# Patient Record
Sex: FEMALE | Race: WHITE | NOT HISPANIC OR LATINO | Employment: UNEMPLOYED | URBAN - METROPOLITAN AREA
[De-identification: names, ages, dates, MRNs, and addresses within clinical notes are randomized per-mention and may not be internally consistent; named-entity substitution may affect disease eponyms.]

---

## 2017-11-21 ENCOUNTER — ALLSCRIPTS OFFICE VISIT (OUTPATIENT)
Dept: OTHER | Facility: OTHER | Age: 37
End: 2017-11-21

## 2017-11-22 NOTE — PROGRESS NOTES
Plan  Contraception    · Junel FE 1/20 1-20 MG-MCG Oral Tablet; TAKE 1 TABLET DAILY   Rx By: Kaleb Porter; Dispense: 84 Days ; #:84 Tablet; Refill: 0;Contraception; LAVERNE = N; Verified Transmission to [address] [place] [address] [id number]; Last Updated By: System, [employer]; 2017 2:47:30 PM    Discussion/Summary    The patient was informed of a stable gyn examination  A Pap smear was not performed because of prior history being HPV negative in   After discussion with the patient and her  with they have decided they would like to go back on the birth control pill for cycle control  And also for contraception  She has no absolute contraindications  She is not a smoker  She has been on the pill before without problems  Will now put her back on the [address] [place] [address]  INSTRUCTIONS WERE GIVEN  I have asked her return my office in 2 months for OCP surveillance  She was also given a brochure about the IUD she may choose this method later  We will now start using the birth control pill for cycle control and contraception  There are no barriers to this goal    Patient is able to Self-Care  Possible side effects of new medications were reviewed with the patient/guardian today  The treatment plan was reviewed with the patient/guardian  The patient/guardian understands and agrees with the treatment plan      Chief Complaint  yearly      History of Present Illness  HPI: This is a 52-year-old  female, she is a  2 para 2 with 2 prior vaginal deliveries  Her current method of contraception includes for nectar family planning and withdrawal  She is happy with this method and is aware of the possibility of pregnancy  She has noted that her menstrual cycles are lasting over the bit longer, this is by secondary to going off the birth control pill  She denies any other major gynecological  GI complaint  There is no problem with intimacy  She did express a desire for and exercise plan   The patient denies any problem with depression or anxiety  There are no new major family illnesses report this time  GYN HM, Adult Female [facility]: The patient is being seen for a gynecology evaluation  The last health maintenance visit was 1 year(s) ago  General Health: The patient's health since the last visit is described as good  She has regular dental visits  -- She complains of vision problems  -- She denies hearing loss  Lifestyle:  She consumes a diverse and healthy diet  -- She does not have any weight concerns  -- She does not exercise regularly  -- She does not use tobacco -- She denies alcohol use  -- She denies drug use  Reproductive health:  she reports menstrual problems  Menstrual history: Menstrual Problems: prolonged menses 7 -10 days  Screening:      Review of Systems  Additional findings include prolonged menses  Constitutional: No fever, no chills, feels well, no tiredness, no recent weight gain or loss  ENT: no ear ache, no loss of hearing, no nosebleeds or nasal discharge, no sore throat or hoarseness  Cardiovascular: no complaints of slow or fast heart rate, no chest pain, no palpitations, no leg claudication or lower extremity edema  Respiratory: no complaints of shortness of breath, no wheezing, no dyspnea on exertion, no orthopnea or PND  Breasts: no complaints of breast pain, breast lump or nipple discharge  Gastrointestinal: no complaints of abdominal pain, no constipation, no nausea or diarrhea, no vomiting, no bloody stools  Genitourinary: no complaints of dysuria, no incontinence, no pelvic pain, no dysmenorrhea, no vaginal discharge or abnormal vaginal bleeding  Musculoskeletal: no complaints of arthralgia, no myalgia, no joint swelling or stiffness, no limb pain or swelling  Integumentary: no complaints of skin rash or lesion, no itching or dry skin, no skin wounds  Neurological: no complaints of headache, no confusion, no numbness or tingling, no dizziness or fainting       Over the past 2 weeks, how often have you been bothered by the following problems? 1 ) Little interest or pleasure in doing things? Not at all   2 ) Feeling down, depressed or hopeless? Not at all   3 ) Trouble falling asleep or sleeping too much? Not at all   4 ) Feeling tired or having little energy? Not at all   5 ) Poor appetite or overeating? Not at all   6 ) Feeling bad about yourself, or that you are a failure, or have let yourself or your family down? Not at all   7 ) Trouble concentrating on things, such as reading a newspaper or watching television? Not at all   8 ) Moving or speaking so slowly that other people could have noticed, or the opposite, moving or speaking faster than usual? Not at all  How difficult have these problems made it for you to do your work, take care of things at home, or get along with people? Not at all  Score       OB History  Pregnancy History (Brief):  Prior pregnancies: : 2  Para: 2 (full-term)-- and-- 2 (living)  Delivery type: vaginal       Active Problems  1  Encounter for gynecological examination () (Z01 419)    Family History  Mother    · Family history of diabetes mellitus (V18 0) (Z83 3)   · Family history of hypertension (V17 49) (Z82 49)  Father    · Family history of liver cancer (V16 0) (Z80 0)    Social History   · Former smoker (V15 82) (R59 910)    Current Meds   1  No Reported Medications Recorded    Allergies  1  No Known Drug Allergies    Vitals   Recorded: [id number] 07:59HM   Systolic 562   Diastolic 74   Height 5 ft 10 in   Weight 164 lb    BMI Calculated 23 53   BSA Calculated 1 92   LMP [id number]       Physical Exam   Constitutional  General appearance: No acute distress, well appearing and well nourished  Neck  Neck: Normal, supple, trachea midline, no masses  Thyroid: Normal, no thyromegaly  Pulmonary  Respiratory effort: No increased work of breathing or signs of respiratory distress  Auscultation of lungs: Clear to auscultation  Cardiovascular  Auscultation of heart: Normal rate and rhythm, normal S1 and S2, no murmurs  Peripheral vascular exam: Normal pulses Throughout  Genitourinary  External genitalia: Normal and no lesions appreciated  Vagina: Normal, no lesions or dryness appreciated  Urethra: Normal    Urethral meatus: Normal    Bladder: Normal, soft, non-tender and no prolapse or masses appreciated  Cervix: Normal, no palpable masses  Uterus: Normal, non-tender, not enlarged, and no palpable masses  Adnexa/parametria: Normal, non-tender and no fullness or masses appreciated  Anus, perineum, and rectum: Normal sphincter tone, no masses, and no prolapse  Chest  Breasts: Normal and no dimpling or skin changes noted  Abdomen  Abdomen: Normal, non-tender, and no organomegaly noted  Liver and spleen: No hepatomegaly or splenomegaly  Examination for hernias: No hernias appreciated  Lymphatic  Palpation of lymph nodes in neck, axillae, groin and/or other locations: No lymphadenopathy or masses noted  Skin  Skin and subcutaneous tissue: Normal skin turgor and no rashes     Palpation of skin and subcutaneous tissue: Normal    Psychiatric  Orientation to person, place, and time: Normal    Mood and affect: Normal        Signatures   Electronically signed by : PROSPER Blanco ; Nov 21 2017  2:49PM EST                       (Author)

## 2018-01-13 VITALS
WEIGHT: 164 LBS | DIASTOLIC BLOOD PRESSURE: 74 MMHG | SYSTOLIC BLOOD PRESSURE: 114 MMHG | BODY MASS INDEX: 23.48 KG/M2 | HEIGHT: 70 IN

## 2018-02-14 DIAGNOSIS — Z30.41 ENCOUNTER FOR SURVEILLANCE OF CONTRACEPTIVE PILLS: Primary | ICD-10-CM

## 2018-02-14 RX ORDER — NORETHINDRONE ACETATE/ETHINYL ESTRADIOL AND FERROUS FUMARATE 1MG-20(21)
KIT ORAL
Qty: 84 TABLET | Refills: 0 | Status: SHIPPED | OUTPATIENT
Start: 2018-02-14

## 2018-02-28 ENCOUNTER — OFFICE VISIT (OUTPATIENT)
Dept: OBGYN CLINIC | Facility: CLINIC | Age: 38
End: 2018-02-28
Payer: COMMERCIAL

## 2018-02-28 VITALS
DIASTOLIC BLOOD PRESSURE: 82 MMHG | SYSTOLIC BLOOD PRESSURE: 138 MMHG | WEIGHT: 181 LBS | BODY MASS INDEX: 28.41 KG/M2 | HEIGHT: 67 IN

## 2018-02-28 DIAGNOSIS — Z30.41 ENCOUNTER FOR SURVEILLANCE OF CONTRACEPTIVE PILLS: Primary | ICD-10-CM

## 2018-02-28 PROCEDURE — 99213 OFFICE O/P EST LOW 20 MIN: CPT | Performed by: OBSTETRICS & GYNECOLOGY

## 2018-02-28 RX ORDER — NORETHINDRONE ACETATE AND ETHINYL ESTRADIOL 1; .02 MG/1; MG/1
1 TABLET ORAL DAILY
Qty: 42 TABLET | Refills: 3 | Status: SHIPPED | OUTPATIENT
Start: 2018-02-28 | End: 2018-04-16 | Stop reason: SDUPTHER

## 2018-02-28 NOTE — PROGRESS NOTES
This is a 60-year-old white female accompanied by her  for follow-up for birth control pills for contraception  The current pill she is on is a generic form  She does not like this pill  She says she feels more flush  Feel she gets a rash  She is now requesting to go back on the brand name Guadalupe Worley DECIDED TO TRY TO BRAN FOR THE NEXT 2 MONTHS  RETURN MY OFFICE IN 1 MONTH FOR RE-EVALUATION OF ELEVATED BLOOD PRESSURE AND SEE IF THE RASH IS BETTER  WE MAY NEED TO CONSIDER OTHER METHODS OF CONTRACEPTION  SHE WAS INSTRUCTED TO USE A BACKUP METHOD FOR THE NEXT MONTH AS CHANGED  THE WAY SHE IS TAKING THE PILL  HER  WAS TRANSLATING AND THEY BOTH SEEMED TO UNDERSTAND  RETURN TO OFFICE IN 1 MONTH FOR RE-EVALUATION CHECK BLOOD PRESSURE

## 2018-04-16 ENCOUNTER — OFFICE VISIT (OUTPATIENT)
Dept: OBGYN CLINIC | Facility: CLINIC | Age: 38
End: 2018-04-16
Payer: COMMERCIAL

## 2018-04-16 VITALS
WEIGHT: 179.8 LBS | DIASTOLIC BLOOD PRESSURE: 78 MMHG | HEIGHT: 67 IN | BODY MASS INDEX: 28.22 KG/M2 | SYSTOLIC BLOOD PRESSURE: 128 MMHG

## 2018-04-16 DIAGNOSIS — Z30.41 ENCOUNTER FOR SURVEILLANCE OF CONTRACEPTIVE PILLS: Primary | ICD-10-CM

## 2018-04-16 PROCEDURE — 99213 OFFICE O/P EST LOW 20 MIN: CPT | Performed by: OBSTETRICS & GYNECOLOGY

## 2018-04-16 RX ORDER — NORETHINDRONE ACETATE AND ETHINYL ESTRADIOL 1; .02 MG/1; MG/1
1 TABLET ORAL DAILY
Qty: 63 TABLET | Refills: 3 | Status: SHIPPED | OUTPATIENT
Start: 2018-04-16 | End: 2018-11-28 | Stop reason: SDUPTHER

## 2018-04-16 NOTE — PATIENT INSTRUCTIONS
The patient is doing well on this birth control pill    Will continue that she will make appointment for yearly exam

## 2018-04-16 NOTE — PROGRESS NOTES
This is a 78-year-old female accompanied by her  as a   She is a  2 para 2  She is now here for birth control pill follow-up  Weeks which should to the Glenis Martin this is working better she likes better  Her side effects are much improved  Will now use for contraception her refill was authorized  She should keep her yearly exam as scheduled

## 2018-11-28 ENCOUNTER — ANNUAL EXAM (OUTPATIENT)
Dept: OBGYN CLINIC | Facility: CLINIC | Age: 38
End: 2018-11-28
Payer: COMMERCIAL

## 2018-11-28 VITALS
BODY MASS INDEX: 28.94 KG/M2 | HEIGHT: 67 IN | DIASTOLIC BLOOD PRESSURE: 78 MMHG | WEIGHT: 184.4 LBS | SYSTOLIC BLOOD PRESSURE: 120 MMHG

## 2018-11-28 DIAGNOSIS — Z30.41 ENCOUNTER FOR SURVEILLANCE OF CONTRACEPTIVE PILLS: ICD-10-CM

## 2018-11-28 DIAGNOSIS — Z01.419 WOMEN'S ANNUAL ROUTINE GYNECOLOGICAL EXAMINATION: Primary | ICD-10-CM

## 2018-11-28 PROCEDURE — 99395 PREV VISIT EST AGE 18-39: CPT | Performed by: OBSTETRICS & GYNECOLOGY

## 2018-11-28 RX ORDER — NORETHINDRONE ACETATE AND ETHINYL ESTRADIOL 1; .02 MG/1; MG/1
1 TABLET ORAL DAILY
Qty: 84 TABLET | Refills: 3 | Status: SHIPPED | OUTPATIENT
Start: 2018-11-28

## 2018-11-28 NOTE — PATIENT INSTRUCTIONS
The patient was informed of a stable gyn examination  No Pap smear because of prior history being HPV negative in the year 2016  Will given information about the intrauterine device  Return to office in 1 year  A refill of birth control pills authorized

## 2019-05-30 DIAGNOSIS — Z30.41 ENCOUNTER FOR SURVEILLANCE OF CONTRACEPTIVE PILLS: ICD-10-CM

## 2019-06-01 RX ORDER — NORETHINDRONE ACETATE AND ETHINYL ESTRADIOL 1; 20 MG/1; UG/1
TABLET ORAL
Qty: 42 TABLET | Refills: 3 | Status: SHIPPED | OUTPATIENT
Start: 2019-06-01 | End: 2020-12-07 | Stop reason: SDUPTHER

## 2019-12-09 ENCOUNTER — ANNUAL EXAM (OUTPATIENT)
Dept: OBGYN CLINIC | Facility: CLINIC | Age: 39
End: 2019-12-09
Payer: COMMERCIAL

## 2019-12-09 VITALS
SYSTOLIC BLOOD PRESSURE: 116 MMHG | HEIGHT: 67 IN | DIASTOLIC BLOOD PRESSURE: 78 MMHG | WEIGHT: 179.6 LBS | BODY MASS INDEX: 28.19 KG/M2

## 2019-12-09 DIAGNOSIS — Z01.419 WOMEN'S ANNUAL ROUTINE GYNECOLOGICAL EXAMINATION: Primary | ICD-10-CM

## 2019-12-09 PROCEDURE — G0145 SCR C/V CYTO,THINLAYER,RESCR: HCPCS | Performed by: OBSTETRICS & GYNECOLOGY

## 2019-12-09 PROCEDURE — 87624 HPV HI-RISK TYP POOLED RSLT: CPT | Performed by: OBSTETRICS & GYNECOLOGY

## 2019-12-09 PROCEDURE — 99395 PREV VISIT EST AGE 18-39: CPT | Performed by: OBSTETRICS & GYNECOLOGY

## 2019-12-09 RX ORDER — DOXYCYCLINE HYCLATE 100 MG
100 TABLET ORAL 2 TIMES DAILY
Refills: 1 | COMMUNITY
Start: 2019-10-23

## 2019-12-09 NOTE — PATIENT INSTRUCTIONS
The patient was informed of a stable gyn examination except for dermatitis of the lower half of the external genitalia bilaterally  This is consistent with dermatitis  Will treat with a topical steroid cream   Twice a day for next 7 days  She will call me if there is no improvement  She was also given information about the IUD for contraception she will let us know that decision  Return my office on a p r n  Basis

## 2019-12-09 NOTE — PROGRESS NOTES
Assessment/Plan:    The patient was informed of a stable gyn examination except for bilateral redness of the external genitalia lower half of the external genitalia, consistent with for dermatitis  She has no obvious infection the vagina is clean  Patient was instructed to with cord 8 cream 2 times a day for 7 days on external genitalia keep me informed of her progress  A Pap smear was performed  She may consider the IUD for contraception  A brochure was given to she and her  and questions call back later  She will be allowed to continue the birth control pill at the present time  She should return my office in 1 year unless new problems arise  Subjective:      Patient ID: Tamara Prieto is a 46 y o  female  HPI       This is a 71-year-old white female, she is accompanied by her   She is a  2 para 2 with 2 prior vaginal deliveries  We still have a language barrier  Her  serves as a   The of both from [place]  Her current method of includes the birth control pill  She was inquiring about the possible using the out the IUD  A brochure was given  Her  will call us so the decision  She denies any major  GI complaint  She is complaining of frequent itching of the external genitalia  She does have a history of rosacea has been treated for that  She denies any problem abnormal vaginal discharge  She is happy with her weight  She has a dentist on a regular basis  She denies any problem depression or anxiety  Her children are doing well  There are no new major family illnesses report at this time  The following portions of the patient's history were reviewed and updated as appropriate: allergies, current medications, past family history, past medical history, past social history, past surgical history and problem list     Review of Systems   All other systems reviewed and are negative          Objective:      /78   Ht 5' 7" (1 702 m)   Wt 81 5 kg (179 lb 9 6 oz)   LMP 12/03/2019   BMI 28 13 kg/m²          Physical Exam   Constitutional: She appears well-developed and well-nourished  HENT:   Head: Normocephalic and atraumatic  Eyes: EOM are normal    Neck: Normal range of motion  Neck supple  No JVD present  No tracheal deviation present  No thyromegaly present  Cardiovascular: Normal rate, regular rhythm, normal heart sounds and intact distal pulses  Pulmonary/Chest: Effort normal and breath sounds normal  No stridor  No respiratory distress  She has no wheezes  She has no rales  Right breast exhibits no inverted nipple, no mass, no nipple discharge, no skin change and no tenderness  Left breast exhibits no inverted nipple, no mass, no nipple discharge, no skin change and no tenderness  No breast swelling, tenderness, discharge or bleeding  Breasts are symmetrical    Abdominal: Soft  Bowel sounds are normal  She exhibits no distension and no mass  There is no tenderness  There is no rebound and no guarding  No hernia  Hernia confirmed negative in the right inguinal area and confirmed negative in the left inguinal area  Genitourinary: Uterus normal  No labial fusion  There is rash on the right labia  There is no tenderness, lesion or injury on the right labia  There is no tenderness, lesion or injury on the left labia  Uterus is not deviated, not enlarged, not fixed and not tender  Cervix exhibits no motion tenderness, no discharge and no friability  Right adnexum displays no mass, no tenderness and no fullness  Left adnexum displays no mass, no tenderness and no fullness  No erythema, tenderness or bleeding in the vagina  No foreign body in the vagina  No signs of injury around the vagina  No vaginal discharge found  Genitourinary Comments: The external genitalia on the lower half is consistent with irritation and dermatitis  The vagina is otherwise clean  A Pap smear was performed   Musculoskeletal: Normal range of motion  Lymphadenopathy:     She has no cervical adenopathy  No inguinal adenopathy noted on the right or left side  Neurological: She is alert  Skin: Skin is warm and dry  Psychiatric: She has a normal mood and affect   Her behavior is normal

## 2019-12-11 LAB
HPV HR 12 DNA CVX QL NAA+PROBE: NEGATIVE
HPV16 DNA CVX QL NAA+PROBE: NEGATIVE
HPV18 DNA CVX QL NAA+PROBE: NEGATIVE

## 2019-12-12 LAB
LAB AP GYN PRIMARY INTERPRETATION: NORMAL
LAB AP LMP: NORMAL
Lab: NORMAL

## 2020-01-06 ENCOUNTER — TELEPHONE (OUTPATIENT)
Dept: OBGYN CLINIC | Facility: CLINIC | Age: 40
End: 2020-01-06

## 2020-01-06 NOTE — TELEPHONE ENCOUNTER
[facility] calling for rf for Junel 1/20 - pt here for yearly 12/9/19 - verbal to pharm for Junel FE 1/20 # 84, rf x 3

## 2020-11-09 ENCOUNTER — TELEPHONE (OUTPATIENT)
Dept: OBGYN CLINIC | Facility: CLINIC | Age: 40
End: 2020-11-09

## 2020-12-07 DIAGNOSIS — Z30.41 ENCOUNTER FOR SURVEILLANCE OF CONTRACEPTIVE PILLS: ICD-10-CM

## 2020-12-07 RX ORDER — NORETHINDRONE ACETATE AND ETHINYL ESTRADIOL 1; 20 MG/1; UG/1
1 TABLET ORAL DAILY
Qty: 63 TABLET | Refills: 1 | Status: SHIPPED | OUTPATIENT
Start: 2020-12-07

## 2021-03-03 ENCOUNTER — ANNUAL EXAM (OUTPATIENT)
Dept: OBGYN CLINIC | Facility: CLINIC | Age: 41
End: 2021-03-03
Payer: COMMERCIAL

## 2021-03-03 VITALS
WEIGHT: 192.6 LBS | BODY MASS INDEX: 30.23 KG/M2 | DIASTOLIC BLOOD PRESSURE: 82 MMHG | HEIGHT: 67 IN | SYSTOLIC BLOOD PRESSURE: 140 MMHG

## 2021-03-03 DIAGNOSIS — Z01.419 WOMEN'S ANNUAL ROUTINE GYNECOLOGICAL EXAMINATION: Primary | ICD-10-CM

## 2021-03-03 PROCEDURE — 99395 PREV VISIT EST AGE 18-39: CPT | Performed by: OBSTETRICS & GYNECOLOGY

## 2021-03-03 NOTE — PATIENT INSTRUCTIONS
The patient was informed of a stable gyn examination  A Pap smear was not performed  There was no evidence of infection or discharge or odor  There is no cervical motion tenderness  We had a discussion about using the IUD  A brochure was given  She plans discussed with her  and make the decision sometime in the next month or so about having the IUD inserted  She will make arrangements for mammogram after her 44th birthday  Return my office as needed

## 2021-03-03 NOTE — PROGRESS NOTES
Assessment/Plan:      The patient was informed of a stable gyn examination  A Pap smear was not performed  We had a discussion again about the birth control birth was the IUD  With her borderline blood pressures I have suggested she strongly consider the IUD  She also thinks that will be a better bleeding pattern  She also has some very other complaints with the birth control pill  She will discuss it with her  let me know her decision hopefully within the next 3 months to change birth control  She is not happy with her weight she will try lose more weight  Denies any problem depression or anxiety  No major  GI complaints  Will get a mammogram after her 44th birthday  She is instructed discuss the issue of IUD with  and come to my office sometime in the next 2 months the IUD insertion  Subjective:      Patient ID: Gibson Lacy is a 45 y o  female  HPI    This is a 41-year-old white female, she is from [facility]  Her English is greatly improved  She now speak for herself and does not need   Currently she is using the birth control pill for contraception  She is a  2 para 2 with 2 prior vaginal deliveries  Her younger shot use all has now been diagnosed with autism  She is not happy with the birth control pill because of bleeding issues in occasional discomfort and pain  We had discussion last year about using the IUD  She now to consider that again  A brochure was given to her  Occasion she has some burning with intercourse  She is not happy with her weight  She has some occasional anxiety but no depression  She denies any major  GI complaint  There are no new major family illnesses report  She is dealing well with COVID situation  She feels safe at home        The following portions of the patient's history were reviewed and updated as appropriate: allergies, current medications, past family history, past medical history, past social history, past surgical history and problem list     Review of Systems   All other systems reviewed and are negative  Objective:      /82   Ht 5' 7" (1 702 m)   Wt 87 4 kg (192 lb 9 6 oz)   LMP 02/23/2021 (Exact Date)   BMI 30 17 kg/m²          Physical Exam  Exam conducted with a chaperone present  Constitutional:       Appearance: Normal appearance  She is normal weight  HENT:      Head: Normocephalic and atraumatic  Eyes:      Extraocular Movements: Extraocular movements intact  Neck:      Musculoskeletal: Normal range of motion and neck supple  Cardiovascular:      Rate and Rhythm: Normal rate and regular rhythm  Pulses: Normal pulses  Heart sounds: Normal heart sounds  Pulmonary:      Effort: Pulmonary effort is normal       Breath sounds: Normal breath sounds  Chest:      Breasts:         Right: Normal  No swelling, bleeding, inverted nipple, mass or nipple discharge  Left: Normal  No swelling, bleeding, inverted nipple, mass or nipple discharge  Abdominal:      General: Bowel sounds are normal  There is no distension or abdominal bruit  Palpations: There is no mass  Tenderness: There is no abdominal tenderness  Hernia: No hernia is present  There is no hernia in the umbilical area, ventral area, left inguinal area or right inguinal area  Genitourinary:     General: Normal vulva  Pubic Area: No rash or pubic lice  Labia:         Right: No rash, tenderness, lesion or injury  Left: No rash, tenderness, lesion or injury  Urethra: No prolapse, urethral pain, urethral swelling or urethral lesion  Vagina: Normal  No signs of injury and foreign body  No vaginal discharge, erythema, tenderness, bleeding, lesions or prolapsed vaginal walls  Cervix: Normal       Uterus: Normal  Not deviated, not enlarged, not fixed, not tender and no uterine prolapse         Adnexa: Right adnexa normal and left adnexa normal         Right: No mass, tenderness or fullness  Left: No mass, tenderness or fullness  Rectum: Normal       Comments: The external genitalia normal limits the vagina is clean there is no discharge  There is no cervical motion tenderness  The cervix is parous but closed  A Pap smear was not performed uterus is anteverted normal size  The adnexa clear bilaterally  There is no prolapse of the uterus the bladder or the urethra  There was no discharge there was no odor  Musculoskeletal: Normal range of motion  Lymphadenopathy:      Upper Body:      Right upper body: No supraclavicular or axillary adenopathy  Left upper body: No supraclavicular or axillary adenopathy  Lower Body: No right inguinal adenopathy  No left inguinal adenopathy  Skin:     General: Skin is warm and dry  Neurological:      General: No focal deficit present  Mental Status: She is alert and oriented to person, place, and time  Psychiatric:         Mood and Affect: Mood normal          Behavior: Behavior normal          Thought Content:  Thought content normal

## 2021-04-06 DIAGNOSIS — Z30.41 ENCOUNTER FOR SURVEILLANCE OF CONTRACEPTIVE PILLS: ICD-10-CM

## 2021-04-06 RX ORDER — NORETHINDRONE ACETATE AND ETHINYL ESTRADIOL 1; 20 MG/1; UG/1
TABLET ORAL
Qty: 63 TABLET | Refills: 1 | OUTPATIENT
Start: 2021-04-06

## 2021-04-09 NOTE — TELEPHONE ENCOUNTER
Spoke with pt's , Yolanda - states BP at podaitrist's office approx 1 month ago was 123/80  States she is agreeable to IUD - he had contacted insur & was told our office needs to call for auth

## 2021-04-14 ENCOUNTER — TELEPHONE (OUTPATIENT)
Dept: OBGYN CLINIC | Facility: CLINIC | Age: 41
End: 2021-04-14

## 2021-04-18 DIAGNOSIS — Z30.41 ENCOUNTER FOR SURVEILLANCE OF CONTRACEPTIVE PILLS: ICD-10-CM

## 2021-04-18 RX ORDER — NORETHINDRONE ACETATE AND ETHINYL ESTRADIOL 1; 20 MG/1; UG/1
TABLET ORAL
Qty: 63 TABLET | Refills: 1 | OUTPATIENT
Start: 2021-04-18

## 2021-04-27 ENCOUNTER — TRANSCRIBE ORDERS (OUTPATIENT)
Dept: ADMINISTRATIVE | Facility: HOSPITAL | Age: 41
End: 2021-04-27

## 2021-04-27 ENCOUNTER — TRANSCRIBE ORDERS (OUTPATIENT)
Dept: OBGYN CLINIC | Facility: CLINIC | Age: 41
End: 2021-04-27

## 2021-04-27 ENCOUNTER — HOSPITAL ENCOUNTER (OUTPATIENT)
Dept: ULTRASOUND IMAGING | Facility: CLINIC | Age: 41
Discharge: HOME | End: 2021-04-27
Payer: COMMERCIAL

## 2021-04-27 ENCOUNTER — HOSPITAL ENCOUNTER (OUTPATIENT)
Dept: MAMMOGRAPHY | Facility: CLINIC | Age: 41
Discharge: HOME | End: 2021-04-27
Payer: COMMERCIAL

## 2021-04-27 ENCOUNTER — OFFICE VISIT (OUTPATIENT)
Dept: OBGYN CLINIC | Facility: CLINIC | Age: 41
End: 2021-04-27
Payer: COMMERCIAL

## 2021-04-27 VITALS
BODY MASS INDEX: 29.98 KG/M2 | DIASTOLIC BLOOD PRESSURE: 90 MMHG | HEIGHT: 67 IN | WEIGHT: 191 LBS | SYSTOLIC BLOOD PRESSURE: 144 MMHG

## 2021-04-27 DIAGNOSIS — N63.20 LEFT BREAST LUMP: ICD-10-CM

## 2021-04-27 DIAGNOSIS — N63.20 LEFT BREAST LUMP: Primary | ICD-10-CM

## 2021-04-27 DIAGNOSIS — Z12.31 ENCOUNTER FOR SCREENING MAMMOGRAM FOR BREAST CANCER: Primary | ICD-10-CM

## 2021-04-27 PROCEDURE — G0279 TOMOSYNTHESIS, MAMMO: HCPCS

## 2021-04-27 PROCEDURE — 76642 ULTRASOUND BREAST LIMITED: CPT

## 2021-04-27 PROCEDURE — 99213 OFFICE O/P EST LOW 20 MIN: CPT | Performed by: OBSTETRICS & GYNECOLOGY

## 2021-04-27 PROCEDURE — 77066 DX MAMMO INCL CAD BI: CPT

## 2021-04-27 NOTE — PROGRESS NOTES
This is a 45-year-old white female, she is a  2 para 2  She is now complaining 1 week duration of left breast lump which is painful  She received the COVID vaccine earlier in the month  She denies fever chills  No history of recent trauma  Examination of the left breast shows that there is a fullness tender lump measuring approximately 3 5 width by 3 5 cm  It is at the 5 o'clock position of the breast approximately 2 5 cm from the nipple  The axilla is completely benign with no pain  There is no evidence of recent trauma  The lump is moved in regular and uncomfortable  Impression 1 week duration of a tender left breast this is lump 3 53 5 cm  Will now refer to ultrasound for ultrasound of the breast   Depending on findings she may need consultation with a breast surgeon

## 2021-04-27 NOTE — PATIENT INSTRUCTIONS
There is a breast lump measuring 3 5 x 3 5 cm at the  5 o'clock position of the left breast   Approximately 2 cm from the nipple  Will refer for radiology consultation for ultrasound  May need consultation with the breast surgeon later

## 2021-04-30 ENCOUNTER — TELEPHONE (OUTPATIENT)
Dept: OBGYN CLINIC | Facility: CLINIC | Age: 41
End: 2021-04-30

## 2021-04-30 NOTE — TELEPHONE ENCOUNTER
----- Message from Casey Aguilar MD sent at 4/28/2021  5:03 PM EDT -----  I left a message for this patient to call you on Friday she needs to be evaluated by a a breast surgeon Dr Arvind Lopez or a general surgeon for benign breast cyst in may need just an aspiration    Thank you

## 2021-05-10 NOTE — TELEPHONE ENCOUNTER
Lm pt's as re: recom f/u with breast specialist (Dr Judit Silverman) - also is pt still interested in IUD?

## 2021-05-19 NOTE — TELEPHONE ENCOUNTER
Spoke with pt's , Yolanda - informed JSW recom f/u with Dr Ashley Koenig for evaluation of (L) breast cyst   Also will recall office if pt wants to schedule appt for IUD insertion

## 2021-06-04 ENCOUNTER — TELEPHONE (OUTPATIENT)
Dept: SURGICAL ONCOLOGY | Facility: CLINIC | Age: 41
End: 2021-06-04

## 2021-06-04 NOTE — TELEPHONE ENCOUNTER
New Patient Breast Form   Patient Details:     Ulisses Becerra     1980     [id number]     Background Information:   [facility] starts by opening a telephone encounter and gathering the following information   Who is calling to schedule and relationship? Spouse   Referring Provider Baker Loges   To which speciality is the referral?  Surgical Oncology   Reason for Visit? New Diagnosis   Tumor Type?  breast lump   Is there a confimed diagnosis from biopsy/tissue reviewed by Pathology? No   Date of Tissue Diagnosis (If done outside of [facility] please obtain report and slides) na   Is patient aware of diagnosis, and who made them aware? Yes   If no tissue diagnosis, please stop and discuss with Navigator prior to scheduling   When was the diagnosis made? 6/4   Were outside slides requested  No   If biopsy done externally, obtain reports and slides for internal review   Have you had any imaging or labs done? Yes   If YES, when and  where was the blood work and imaging done? [facility]   If outside of [facility] obtain records   Was the patient told to bring a disk? No   Are records in EPIC? Yes   Is there a personal history of cancer? No   If YES please list type and YR diagnosed na   If patient has a prior history of breast cancer were old records obtained? NA   Have you ever had genetic testing done for breast cancer? If so, can you please bring a copy to appointment for timely treatment planning No   Is there a family history of cancer? Yes   If YES please list type na   Scheduling Information:   [facility]   Are there any days the patient cannot be seen?  na   How was New Patient Packet Sent? Email   Miscellaneous:   After completing the above information, please route to finance, nurse navigation and clinical trials for review

## 2021-09-22 ENCOUNTER — TELEPHONE (OUTPATIENT)
Dept: SURGICAL ONCOLOGY | Facility: CLINIC | Age: 41
End: 2021-09-22

## 2021-09-22 NOTE — TELEPHONE ENCOUNTER
Patient was 15 minutes late for her new patient consult and she arrived without new patient paperwork  Spoke to Dr Сергей Mohan and she could not see the patien due to her busy schedulet  I then explained to the patient the reason Dr Сергей Mohan couldn't see her  (late and no paperwork)  Patient turned around and left without rescheduling her appointment

## 2022-04-08 ENCOUNTER — ANNUAL EXAM (OUTPATIENT)
Dept: OBGYN CLINIC | Facility: CLINIC | Age: 42
End: 2022-04-08
Payer: MEDICARE

## 2022-04-08 VITALS
HEIGHT: 67 IN | WEIGHT: 172 LBS | BODY MASS INDEX: 27 KG/M2 | SYSTOLIC BLOOD PRESSURE: 122 MMHG | DIASTOLIC BLOOD PRESSURE: 70 MMHG

## 2022-04-08 DIAGNOSIS — Z01.419 WOMEN'S ANNUAL ROUTINE GYNECOLOGICAL EXAMINATION: ICD-10-CM

## 2022-04-08 DIAGNOSIS — Z12.31 ENCOUNTER FOR SCREENING MAMMOGRAM FOR MALIGNANT NEOPLASM OF BREAST: Primary | ICD-10-CM

## 2022-04-08 PROCEDURE — G0476 HPV COMBO ASSAY CA SCREEN: HCPCS | Performed by: OBSTETRICS & GYNECOLOGY

## 2022-04-08 PROCEDURE — G0145 SCR C/V CYTO,THINLAYER,RESCR: HCPCS | Performed by: OBSTETRICS & GYNECOLOGY

## 2022-04-08 PROCEDURE — 99396 PREV VISIT EST AGE 40-64: CPT | Performed by: OBSTETRICS & GYNECOLOGY

## 2022-04-08 RX ORDER — CHOLECALCIFEROL (VITAMIN D3) 125 MCG
2000 CAPSULE ORAL DAILY
COMMUNITY

## 2022-04-08 NOTE — PROGRESS NOTES
Assessment/Plan:    The patient was informed of a stable gyn examination  She is happy with withdrawal for her method of contraception she has not were but the possibility pregnancy  She is content with her weight loss  Her breast exam was benign  She has have evidence of costochondritis on the left side but she was reassured  She has a history of an abnormal mammogram repeat an order for mammogram today  She denies any problem depression or anxiety  She has a dentist on a regular basis  Her younger shower autism is doing well  She should return to my office in 1 year unless a new problem occurs  Subjective:      Patient ID: Ancelmo Kessler is a 37 y o  female  HPI    This is a 45-year-old female from [address], she is a  2 para 2 with 2 prior vaginal deliveries  Her youngest child is now 5years old  A child has autism  She is dealing well with that  Her English continues to improve  She stop using the birth control pill for contraception because of weight gain and thought to be side effects  Her current method of contraception includes withdrawal   Her  will not consider vasectomy  She is content with this method  Her menstrual cycles are mostly regular  She had 2 periods in a month of March will continue to follow that  She is happy with her weight loss  She feels safe at home  She did see the COVID vaccine  She has a dentist on a regular basis  Denies any major problems with depression or anxiety  She will need to make arrangements for mammogram   She is having some vague muscular pain with lifting of her chest will examine that  There are no new major family illnesses report at this time          The following portions of the patient's history were reviewed and updated as appropriate: allergies, current medications, past family history, past medical history, past social history, past surgical history and problem list     Review of Systems   All other systems reviewed and are negative  Objective:      /70   Ht 5' 7" (1 702 m)   Wt 78 kg (172 lb)   LMP 03/27/2022 (Exact Date)   BMI 26 94 kg/m²          Physical Exam  Vitals reviewed  Exam conducted with a chaperone present  Constitutional:       Appearance: Normal appearance  HENT:      Head: Normocephalic  Eyes:      Extraocular Movements: Extraocular movements intact  Cardiovascular:      Rate and Rhythm: Normal rate and regular rhythm  Pulses: Normal pulses  Heart sounds: Normal heart sounds  Pulmonary:      Effort: Pulmonary effort is normal       Breath sounds: Normal breath sounds  Chest:   Breasts: Breasts are symmetrical       Right: Normal  No swelling, bleeding, inverted nipple, mass, nipple discharge, skin change, tenderness, axillary adenopathy or supraclavicular adenopathy  Left: No swelling, bleeding, inverted nipple, mass, nipple discharge, skin change, tenderness, axillary adenopathy or supraclavicular adenopathy  Abdominal:      General: Abdomen is flat  Bowel sounds are normal  There is no distension  Palpations: Abdomen is soft  There is no hepatomegaly or splenomegaly  Hernia: There is no hernia in the left inguinal area or right inguinal area  Genitourinary:     General: Normal vulva  Pubic Area: No rash or pubic lice  Labia:         Right: No rash, tenderness, lesion or injury  Left: No rash, tenderness, lesion or injury  Urethra: No prolapse, urethral pain, urethral swelling or urethral lesion  Vagina: Normal  No signs of injury and foreign body  No vaginal discharge, erythema, tenderness, bleeding, lesions or prolapsed vaginal walls  Cervix: No cervical motion tenderness, discharge, friability, lesion, erythema, cervical bleeding or eversion  Uterus: Normal  Not deviated, not enlarged, not fixed, not tender and no uterine prolapse  Adnexa: Right adnexa normal         Right: No mass or tenderness  Left: No mass or tenderness  Comments: The external genitalia normal limits the vagina is clean the cervix is parous but closed  Uterus is anterior normal size there is no cervical motion tenderness  The adnexa clear bilaterally  Urethra bladder normal position and consistency there is no evidence of pelvic floor relaxation  Musculoskeletal:         General: Normal range of motion  Cervical back: Normal range of motion and neck supple  Lymphadenopathy:      Upper Body:      Right upper body: No supraclavicular or axillary adenopathy  Left upper body: No supraclavicular or axillary adenopathy  Lower Body: No right inguinal adenopathy  No left inguinal adenopathy  Skin:     General: Skin is warm and dry  Neurological:      General: No focal deficit present  Mental Status: She is alert and oriented to person, place, and time     Psychiatric:         Mood and Affect: Mood normal          Behavior: Behavior normal

## 2022-04-08 NOTE — PATIENT INSTRUCTIONS
The patient was informed of a stable gyn examination  I have asked her to make arrangements to get a mammogram   To continue watch her menstrual cycles return my office in 1 year unless new issues occur

## 2022-04-15 LAB
LAB AP GYN PRIMARY INTERPRETATION: NORMAL
LAB AP LMP: NORMAL
Lab: NORMAL

## 2022-04-28 ENCOUNTER — HOSPITAL ENCOUNTER (OUTPATIENT)
Dept: MAMMOGRAPHY | Facility: MEDICAL CENTER | Age: 42
Discharge: HOME | End: 2022-04-28

## 2022-04-28 DIAGNOSIS — Z12.31 ENCOUNTER FOR SCREENING MAMMOGRAM FOR MALIGNANT NEOPLASM OF BREAST: ICD-10-CM

## 2022-05-24 ENCOUNTER — HOSPITAL ENCOUNTER (OUTPATIENT)
Dept: MAMMOGRAPHY | Facility: MEDICAL CENTER | Age: 42
Discharge: HOME | End: 2022-05-24
Payer: MEDICARE

## 2022-05-24 VITALS — HEIGHT: 67 IN | WEIGHT: 171.96 LBS | BODY MASS INDEX: 26.99 KG/M2

## 2022-05-24 PROCEDURE — 77067 SCR MAMMO BI INCL CAD: CPT

## 2022-05-24 PROCEDURE — 77063 BREAST TOMOSYNTHESIS BI: CPT

## 2023-08-30 ENCOUNTER — HOSPITAL ENCOUNTER (OUTPATIENT)
Dept: MAMMOGRAPHY | Facility: MEDICAL CENTER | Age: 43
Discharge: HOME | End: 2023-08-30
Payer: MEDICARE

## 2023-08-30 VITALS — HEIGHT: 67 IN | WEIGHT: 172 LBS | BODY MASS INDEX: 27 KG/M2

## 2023-08-30 DIAGNOSIS — Z12.31 ENCOUNTER FOR SCREENING MAMMOGRAM FOR MALIGNANT NEOPLASM OF BREAST: ICD-10-CM

## 2023-08-30 PROCEDURE — 77063 BREAST TOMOSYNTHESIS BI: CPT

## 2023-08-30 PROCEDURE — 77067 SCR MAMMO BI INCL CAD: CPT

## 2024-05-13 ENCOUNTER — APPOINTMENT (EMERGENCY)
Dept: RADIOLOGY | Facility: HOSPITAL | Age: 44
End: 2024-05-13
Payer: MEDICARE

## 2024-05-13 ENCOUNTER — HOSPITAL ENCOUNTER (EMERGENCY)
Facility: HOSPITAL | Age: 44
Discharge: HOME | End: 2024-05-13
Attending: EMERGENCY MEDICINE | Admitting: EMERGENCY MEDICINE
Payer: MEDICARE

## 2024-05-13 VITALS
SYSTOLIC BLOOD PRESSURE: 114 MMHG | OXYGEN SATURATION: 99 % | TEMPERATURE: 97.8 F | DIASTOLIC BLOOD PRESSURE: 75 MMHG | HEART RATE: 90 BPM | RESPIRATION RATE: 18 BRPM

## 2024-05-13 DIAGNOSIS — K59.00 CONSTIPATION: Primary | ICD-10-CM

## 2024-05-13 DIAGNOSIS — R10.9 FLANK PAIN: ICD-10-CM

## 2024-05-13 DIAGNOSIS — N39.0 UTI (URINARY TRACT INFECTION): ICD-10-CM

## 2024-05-13 LAB
ALBUMIN SERPL BCP-MCNC: 4.5 G/DL (ref 3.5–5)
ALP SERPL-CCNC: 67 U/L (ref 34–104)
ALT SERPL W P-5'-P-CCNC: 15 U/L (ref 7–52)
ANION GAP SERPL CALCULATED.3IONS-SCNC: 9 MMOL/L (ref 4–13)
AST SERPL W P-5'-P-CCNC: 13 U/L (ref 13–39)
BACTERIA UR QL AUTO: ABNORMAL /HPF
BASOPHILS # BLD AUTO: 0.04 THOUSANDS/ÂΜL (ref 0–0.1)
BASOPHILS NFR BLD AUTO: 1 % (ref 0–1)
BILIRUB SERPL-MCNC: 0.38 MG/DL (ref 0.2–1)
BILIRUB UR QL STRIP: NEGATIVE
BUN SERPL-MCNC: 15 MG/DL (ref 5–25)
CALCIUM SERPL-MCNC: 9.7 MG/DL (ref 8.4–10.2)
CHLORIDE SERPL-SCNC: 103 MMOL/L (ref 96–108)
CLARITY UR: ABNORMAL
CO2 SERPL-SCNC: 26 MMOL/L (ref 21–32)
COLOR UR: YELLOW
CREAT SERPL-MCNC: 0.69 MG/DL (ref 0.6–1.3)
EOSINOPHIL # BLD AUTO: 0.02 THOUSAND/ÂΜL (ref 0–0.61)
EOSINOPHIL NFR BLD AUTO: 0 % (ref 0–6)
ERYTHROCYTE [DISTWIDTH] IN BLOOD BY AUTOMATED COUNT: 13.4 % (ref 11.6–15.1)
EXT PREGNANCY TEST URINE: NEGATIVE
EXT. CONTROL: NORMAL
GFR SERPL CREATININE-BSD FRML MDRD: 106 ML/MIN/1.73SQ M
GLUCOSE SERPL-MCNC: 101 MG/DL (ref 65–140)
GLUCOSE UR STRIP-MCNC: NEGATIVE MG/DL
HCT VFR BLD AUTO: 39.4 % (ref 34.8–46.1)
HGB BLD-MCNC: 13.5 G/DL (ref 11.5–15.4)
HGB UR QL STRIP.AUTO: ABNORMAL
IMM GRANULOCYTES # BLD AUTO: 0.04 THOUSAND/UL (ref 0–0.2)
IMM GRANULOCYTES NFR BLD AUTO: 1 % (ref 0–2)
KETONES UR STRIP-MCNC: NEGATIVE MG/DL
LEUKOCYTE ESTERASE UR QL STRIP: ABNORMAL
LIPASE SERPL-CCNC: 37 U/L (ref 11–82)
LYMPHOCYTES # BLD AUTO: 2.7 THOUSANDS/ÂΜL (ref 0.6–4.47)
LYMPHOCYTES NFR BLD AUTO: 33 % (ref 14–44)
MCH RBC QN AUTO: 31.4 PG (ref 26.8–34.3)
MCHC RBC AUTO-ENTMCNC: 34.3 G/DL (ref 31.4–37.4)
MCV RBC AUTO: 92 FL (ref 82–98)
MONOCYTES # BLD AUTO: 0.59 THOUSAND/ÂΜL (ref 0.17–1.22)
MONOCYTES NFR BLD AUTO: 7 % (ref 4–12)
NEUTROPHILS # BLD AUTO: 4.69 THOUSANDS/ÂΜL (ref 1.85–7.62)
NEUTS SEG NFR BLD AUTO: 58 % (ref 43–75)
NITRITE UR QL STRIP: NEGATIVE
NON-SQ EPI CELLS URNS QL MICRO: ABNORMAL /HPF
NRBC BLD AUTO-RTO: 0 /100 WBCS
PH UR STRIP.AUTO: 7 [PH] (ref 4.5–8)
PLATELET # BLD AUTO: 265 THOUSANDS/UL (ref 149–390)
PMV BLD AUTO: 10 FL (ref 8.9–12.7)
POTASSIUM SERPL-SCNC: 3.8 MMOL/L (ref 3.5–5.3)
PROT SERPL-MCNC: 7.4 G/DL (ref 6.4–8.4)
PROT UR STRIP-MCNC: NEGATIVE MG/DL
RBC # BLD AUTO: 4.3 MILLION/UL (ref 3.81–5.12)
RBC #/AREA URNS AUTO: ABNORMAL /HPF
SODIUM SERPL-SCNC: 138 MMOL/L (ref 135–147)
SP GR UR STRIP.AUTO: 1.01 (ref 1–1.03)
UROBILINOGEN UR QL STRIP.AUTO: 0.2 E.U./DL
WBC # BLD AUTO: 8.08 THOUSAND/UL (ref 4.31–10.16)
WBC #/AREA URNS AUTO: ABNORMAL /HPF

## 2024-05-13 PROCEDURE — 96374 THER/PROPH/DIAG INJ IV PUSH: CPT

## 2024-05-13 PROCEDURE — 83690 ASSAY OF LIPASE: CPT

## 2024-05-13 PROCEDURE — 87086 URINE CULTURE/COLONY COUNT: CPT

## 2024-05-13 PROCEDURE — 87147 CULTURE TYPE IMMUNOLOGIC: CPT

## 2024-05-13 PROCEDURE — 74177 CT ABD & PELVIS W/CONTRAST: CPT

## 2024-05-13 PROCEDURE — 85025 COMPLETE CBC W/AUTO DIFF WBC: CPT

## 2024-05-13 PROCEDURE — 76775 US EXAM ABDO BACK WALL LIM: CPT | Performed by: EMERGENCY MEDICINE

## 2024-05-13 PROCEDURE — 81001 URINALYSIS AUTO W/SCOPE: CPT

## 2024-05-13 PROCEDURE — 80053 COMPREHEN METABOLIC PANEL: CPT

## 2024-05-13 PROCEDURE — 81025 URINE PREGNANCY TEST: CPT

## 2024-05-13 PROCEDURE — 99285 EMERGENCY DEPT VISIT HI MDM: CPT | Performed by: EMERGENCY MEDICINE

## 2024-05-13 PROCEDURE — 36415 COLL VENOUS BLD VENIPUNCTURE: CPT

## 2024-05-13 PROCEDURE — 76705 ECHO EXAM OF ABDOMEN: CPT | Performed by: EMERGENCY MEDICINE

## 2024-05-13 PROCEDURE — 96361 HYDRATE IV INFUSION ADD-ON: CPT

## 2024-05-13 PROCEDURE — 99284 EMERGENCY DEPT VISIT MOD MDM: CPT

## 2024-05-13 RX ORDER — CEPHALEXIN 500 MG/1
500 CAPSULE ORAL ONCE
Status: COMPLETED | OUTPATIENT
Start: 2024-05-13 | End: 2024-05-13

## 2024-05-13 RX ORDER — CEPHALEXIN 500 MG/1
500 CAPSULE ORAL EVERY 6 HOURS SCHEDULED
Qty: 28 CAPSULE | Refills: 0 | Status: SHIPPED | OUTPATIENT
Start: 2024-05-13 | End: 2024-05-20

## 2024-05-13 RX ORDER — KETOROLAC TROMETHAMINE 30 MG/ML
15 INJECTION, SOLUTION INTRAMUSCULAR; INTRAVENOUS ONCE
Status: COMPLETED | OUTPATIENT
Start: 2024-05-13 | End: 2024-05-13

## 2024-05-13 RX ORDER — POLYETHYLENE GLYCOL 3350 17 G/17G
17 POWDER, FOR SOLUTION ORAL DAILY
Qty: 10 EACH | Refills: 0 | Status: SHIPPED | OUTPATIENT
Start: 2024-05-13

## 2024-05-13 RX ADMIN — KETOROLAC TROMETHAMINE 15 MG: 30 INJECTION, SOLUTION INTRAMUSCULAR; INTRAVENOUS at 14:02

## 2024-05-13 RX ADMIN — CEPHALEXIN 500 MG: 500 CAPSULE ORAL at 15:25

## 2024-05-13 RX ADMIN — SODIUM CHLORIDE 1000 ML: 0.9 INJECTION, SOLUTION INTRAVENOUS at 11:48

## 2024-05-13 RX ADMIN — IOHEXOL 100 ML: 350 INJECTION, SOLUTION INTRAVENOUS at 12:43

## 2024-05-13 NOTE — ED PROCEDURE NOTE
Procedure  POC Biliary US    Date/Time: 5/13/2024 11:27 AM    Performed by: Bethany Gill DO  Authorized by: Bethany Gill DO    Patient location:  ED  Performed by:  Resident  Other Assisting Provider: Yes (comment) (Dr. Gore)    Procedure details:     Exam Type:  Diagnostic    Indications: upper right quadrant abdominal pain      Assessment for:  Cholecystitis and cholelithiasis    Views obtained: gallbladder (transverse and longitudinal) and liver      Image quality: diagnostic      Image availability:  Images available in PACS  Findings:     Cholelithiasis: not identified      Common bile duct:  Unable to visualize    Gallbladder wall:  Normal    Gallbladder wall thickness (mm):  1    Pericholecystic fluid: not identified      Polyps: not identified      Mass: not identified    Comments:      Unable to visualize CBD. GB measures 1 cm x 6 cm.   POC Renal US    Date/Time: 5/13/2024 11:31 AM    Performed by: Bethany Gill DO  Authorized by: Bethany Gill DO    Patient location:  ED  Performed by:  Resident  Procedure details:     Exam Type:  Diagnostic    Indications: flank/back pain and abdominal pain      Assessment for:  Bladder volume and suspected hydronephrosis    Views obtained: bladder (transverse and sagittal), left kidney and right kidney      Image quality: diagnostic      Image availability:  Images available in PACS  Findings:     LEFT kidney findings: unremarkable      LEFT hydronephrosis: none      RIGHT kidney findings: unremarkable      RIGHT hydronephrosis: none      Bladder:  Visualized    Bladder findings comment:  Bladder full, no obvious distension  Interpretation:     Renal ultrasound impressions: normal exam                     Bethany Gill DO  05/13/24 1145

## 2024-05-13 NOTE — ED PROVIDER NOTES
History  Chief Complaint   Patient presents with    Flank Pain     Right flank pain that radiates to lower abdomen and lower back. -pain with uriantion -blood in urine.      HPI    Patient is a 44-year-old female currently undergoing GI evaluation for recurrent abdominal bloating sensation after eating, was recently placed on a FODMAP diet, with negative gluten reactivity testing, presenting to the ED for evaluation of right-sided abdominal pain for the past 2 weeks.  Patient states that she has noticed that she is experiencing migratory pain in the right side of her abdomen, sometimes in the lower quadrant, sometimes in the right flank.  She states that the pain is occasionally made worse with eating, occasionally worsens with urination.  She does report a burning sensation in the right side of her abdomen at times.  No fevers, chills, cough or congestion, chest pain, shortness of breath.  Patient does report some associated nausea.  She has not seen any blood in her urine.  Patient did speak to her doctor about this and they initially thought it was coming from the right side of her back as if it was a musculoskeletal origin.  They placed the patient on omeprazole as well as prednisone for back pain.  Patient states she has got minimal relief from the discomfort with the prednisone.  Patient states that she has normal bowel movements.  She has never had a kidney stone before.  Denies pregnancy.    Prior to Admission Medications   Prescriptions Last Dose Informant Patient Reported? Taking?   Cholecalciferol (Vitamin D3) 50 MCG (2000 UT) TABS  Self Yes No   Sig: Take 2,000 Units by mouth daily   Junel 1/20 1-20 MG-MCG per tablet   No No   Sig: Take 1 tablet by mouth daily   Patient not taking: Reported on 4/27/2021   RAMU FE 1/20 1-20 MG-MCG per tablet   No No   Sig: TAKE 1 TABLET BY MOUTH EVERY DAY   Patient not taking: Reported on 11/28/2018   VITAMIN A PO  Self Yes No   Sig: Take by mouth in the morning    VITAMIN E PO  Self Yes No   Sig: Take by mouth in the morning   doxycycline hyclate (VIBRA-TABS) 100 mg tablet   Yes No   Sig: Take 100 mg by mouth 2 (two) times a day   Patient not taking: Reported on 4/8/2022    metroNIDAZOLE (METROCREAM) 0.75 % cream   Yes No   Sig: APPLY TOPICALLY TWO TIMES DAILY   Patient not taking: Reported on 4/8/2022   norethindrone-ethinyl estradiol (JUNEL 1/20) 1-20 MG-MCG per tablet   No No   Sig: Take 1 tablet by mouth daily   Patient not taking: Reported on 3/3/2021      Facility-Administered Medications: None       No past medical history on file.    No past surgical history on file.    Family History   Problem Relation Age of Onset    Diabetes Mother         Diabetes Mellitus    Hypertension Mother     Liver cancer Father     Pancreatic cancer Father     No Known Problems Sister     No Known Problems Sister     No Known Problems Maternal Grandmother     No Known Problems Maternal Grandfather     No Known Problems Paternal Grandmother     No Known Problems Paternal Grandfather     No Known Problems Brother     No Known Problems Son     No Known Problems Son     No Known Problems Maternal Aunt     No Known Problems Maternal Aunt     No Known Problems Maternal Aunt     No Known Problems Maternal Aunt     No Known Problems Maternal Aunt     No Known Problems Maternal Uncle     No Known Problems Maternal Uncle     No Known Problems Paternal Aunt     No Known Problems Paternal Uncle     No Known Problems Paternal Uncle     Breast cancer Neg Hx      I have reviewed and agree with the history as documented.    E-Cigarette/Vaping     E-Cigarette/Vaping Substances     Social History     Tobacco Use    Smoking status: Never    Smokeless tobacco: Never   Substance Use Topics    Alcohol use: No    Drug use: No        Review of Systems   All other systems reviewed and are negative.      Physical Exam  ED Triage Vitals   Temperature Pulse Respirations Blood Pressure SpO2   05/13/24 1005 05/13/24  1005 05/13/24 1005 05/13/24 1005 05/13/24 1005   97.8 °F (36.6 °C) (!) 115 16 (!) 181/116 100 %      Temp Source Heart Rate Source Patient Position - Orthostatic VS BP Location FiO2 (%)   05/13/24 1005 05/13/24 1005 05/13/24 1005 05/13/24 1005 --   Temporal Monitor Sitting Right arm       Pain Score       05/13/24 1402       8             Orthostatic Vital Signs  Vitals:    05/13/24 1005 05/13/24 1526   BP: (!) 181/116 114/75   Pulse: (!) 115 90   Patient Position - Orthostatic VS: Sitting        Physical Exam  Vitals and nursing note reviewed.   Constitutional:       General: She is not in acute distress.     Appearance: Normal appearance. She is well-developed and normal weight. She is not ill-appearing, toxic-appearing or diaphoretic.   HENT:      Head: Normocephalic and atraumatic.      Right Ear: External ear normal.      Left Ear: External ear normal.      Nose: Nose normal. No congestion.      Mouth/Throat:      Mouth: Mucous membranes are moist.      Pharynx: Oropharynx is clear. No oropharyngeal exudate.   Eyes:      General: No scleral icterus.     Extraocular Movements: Extraocular movements intact.      Conjunctiva/sclera: Conjunctivae normal.   Cardiovascular:      Rate and Rhythm: Normal rate and regular rhythm.      Pulses: Normal pulses.      Heart sounds: Normal heart sounds. No murmur heard.  Pulmonary:      Effort: Pulmonary effort is normal. No respiratory distress.      Breath sounds: Normal breath sounds. No wheezing, rhonchi or rales.   Chest:      Chest wall: No tenderness.   Abdominal:      General: Abdomen is flat. Bowel sounds are normal.      Palpations: Abdomen is soft. There is no mass.      Tenderness: There is abdominal tenderness (Right flank and right upper quadrant.). There is no right CVA tenderness, left CVA tenderness, guarding or rebound.   Musculoskeletal:         General: No swelling or signs of injury.      Cervical back: Normal range of motion and neck supple. No  tenderness.      Right lower leg: No edema.      Left lower leg: No edema.      Comments: Tender in the right paralumbar area.   Skin:     General: Skin is warm and dry.      Capillary Refill: Capillary refill takes less than 2 seconds.      Coloration: Skin is not jaundiced.      Findings: No erythema.   Neurological:      General: No focal deficit present.      Mental Status: She is alert and oriented to person, place, and time.   Psychiatric:         Mood and Affect: Mood normal.         ED Medications  Medications   sodium chloride 0.9 % bolus 1,000 mL (0 mL Intravenous Stopped 5/13/24 1523)   iohexol (OMNIPAQUE) 350 MG/ML injection (MULTI-DOSE) 100 mL (100 mL Intravenous Given 5/13/24 1243)   ketorolac (TORADOL) injection 15 mg (15 mg Intravenous Given 5/13/24 1402)   cephalexin (KEFLEX) capsule 500 mg (500 mg Oral Given 5/13/24 1525)       Diagnostic Studies  Results Reviewed       Procedure Component Value Units Date/Time    Urine Microscopic [[id number]]  (Abnormal) Collected: 05/13/24 1233    Lab Status: Final result Specimen: Urine, Clean Catch Updated: 05/13/24 1314     RBC, UA 1-2 /hpf      WBC, UA 10-20 /hpf      Epithelial Cells Occasional /hpf      Bacteria, UA Occasional /hpf     Urine culture [[id number]] Collected: 05/13/24 1233    Lab Status: In process Specimen: Urine, Clean Catch Updated: 05/13/24 1314    POCT pregnancy, urine [[id number]]  (Normal) Resulted: 05/13/24 1234    Lab Status: Final result Updated: 05/13/24 1234     EXT Preg Test, Ur Negative     Control Valid    Urine Macroscopic, POC [[id number]]  (Abnormal) Collected: 05/13/24 1233    Lab Status: Final result Specimen: Urine Updated: 05/13/24 1234     Color, UA Yellow     Clarity, UA Slightly Cloudy     pH, UA 7.0     Leukocytes, UA Moderate     Nitrite, UA Negative     Protein, UA Negative mg/dl      Glucose, UA Negative mg/dl      Ketones, UA Negative mg/dl      Urobilinogen, UA 0.2 E.U./dl      Bilirubin, UA Negative     Occult  Blood, UA Small     Specific [place], UA 1.015    Narrative:      [facility] RESULT    Comprehensive metabolic panel [[id number]] Collected: 05/13/24 1148    Lab Status: Final result Specimen: Blood from Arm, Left Updated: 05/13/24 1217     Sodium 138 mmol/L      Potassium 3.8 mmol/L      Chloride 103 mmol/L      CO2 26 mmol/L      ANION GAP 9 mmol/L      BUN 15 mg/dL      Creatinine 0.69 mg/dL      Glucose 101 mg/dL      Calcium 9.7 mg/dL      AST 13 U/L      ALT 15 U/L      Alkaline Phosphatase 67 U/L      Total Protein 7.4 g/dL      Albumin 4.5 g/dL      Total Bilirubin 0.38 mg/dL      eGFR 106 ml/min/1.73sq m     Narrative:      National Kidney Disease Foundation guidelines for Chronic Kidney Disease (CKD):     Stage 1 with normal or high GFR (GFR > 90 mL/min/1.73 square meters)    Stage 2 Mild CKD (GFR = 60-89 mL/min/1.73 square meters)    Stage 3A Moderate CKD (GFR = 45-59 mL/min/1.73 square meters)    Stage 3B Moderate CKD (GFR = 30-44 mL/min/1.73 square meters)    Stage 4 Severe CKD (GFR = 15-29 mL/min/1.73 square meters)    Stage 5 End Stage CKD (GFR <15 mL/min/1.73 square meters)  Note: GFR calculation is accurate only with a steady state creatinine    Lipase [[id number]]  (Normal) Collected: 05/13/24 1148    Lab Status: Final result Specimen: Blood from Arm, Left Updated: 05/13/24 1217     Lipase 37 u/L     CBC and differential [[id number]] Collected: 05/13/24 1148    Lab Status: Final result Specimen: Blood from Arm, Left Updated: 05/13/24 1157     WBC 8.08 Thousand/uL      RBC 4.30 Million/uL      Hemoglobin 13.5 g/dL      Hematocrit 39.4 %      MCV 92 fL      MCH 31.4 pg      MCHC 34.3 g/dL      RDW 13.4 %      MPV 10.0 fL      Platelets 265 Thousands/uL      nRBC 0 /100 WBCs      Segmented % 58 %      Immature Grans % 1 %      Lymphocytes % 33 %      Monocytes % 7 %      Eosinophils Relative 0 %      Basophils Relative 1 %      Absolute Neutrophils 4.69 Thousands/µL      Absolute Immature Grans 0.04  Thousand/uL      Absolute Lymphocytes 2.70 Thousands/µL      Absolute Monocytes 0.59 Thousand/µL      Eosinophils Absolute 0.02 Thousand/µL      Basophils Absolute 0.04 Thousands/µL                    CT abdomen pelvis with contrast   ED Interpretation by Abel Gore MD (05/13 8545)   Constipation.  No acute intra-abdominal pathology.      Final Result by Tank Montgomery MD (05/13 9408)   Addendum (preliminary) 1 of 1 by Tank Montgomery MD (05/13 8391)   ADDENDUM:      Please note impression should read, no acute intra-abdominal abnormality      Final               Workstation performed: [id number]         US bedside procedure   Final Result by Interface, Externalimages (05/13 4913)            Procedures  Procedures      ED Course       Patient is a 44-year-old female presenting to the ED for evaluation of several day history of right-sided abdominal pain.  History and clinical exam documented above.  Differential includes kidney stone, biliary colic, cholecystitis, UTI, pyelonephritis, appendicitis, constipation, bowel obstruction.  Will do abdominal labs, as well as a CT abdominal pelvis to rule out above.  Patient declined pain medication on my initial evaluation, but later requested meds.  Given Toradol 15 mg IV.  Diagnostics reviewed with the patient.  She does not have any abnormal cell counts, or electrolyte abnormalities.  However, her urine appears to be mildly contaminated, possible UTI.  Given that the patient is experiencing symptoms, will treat with Keflex for 1 week.  Her CT otherwise is unremarkable except for constipation.  Patient encouraged to continue her MiraLAX therapy at home, and I instructed her to increase this to twice daily.  Advised that she should follow up with GI for further investigation into her abdominal discomfort.     I reviewed all testing with the patient:  I gave oral return precautions for what to return for in addition to the written return precautions.   The  patient (and any family present: ) verbalized understanding of the discharge instructions and warnings that would necessitate return to the Emergency Department.  I specifically highlighted areas of special concern regarding the written and verbal discharge instructions and return precautions.    All questions were answered prior to discharge.                        SBIRT 20yo+      Flowsheet Row Most Recent Value   Initial Alcohol Screen: US AUDIT-C     1. How often do you have a drink containing alcohol? 0 Filed at: 05/13/2024 1007   2. How many drinks containing alcohol do you have on a typical day you are drinking?  0 Filed at: 05/13/2024 1007   3b. FEMALE Any Age, or MALE 65+: How often do you have 4 or more drinks on one occassion? 0 Filed at: 05/13/2024 1007   Audit-C Score 0 Filed at: 05/13/2024 1007   BETTYE: How many times in the past year have you...    Used an illegal drug or used a prescription medication for non-medical reasons? Never Filed at: 05/13/2024 [facility]                  Medical Decision Making  Amount and/or Complexity of Data Reviewed  Labs: ordered.  Radiology: ordered and independent interpretation performed.    Risk  Prescription drug management.          Disposition  Final diagnoses:   Constipation   Flank pain   UTI (urinary tract infection)     Time reflects when diagnosis was documented in both MDM as applicable and the Disposition within this note       Time User Action Codes Description Comment    5/13/2024  3:13 PM Duran Cheng [K59.00] Constipation     5/13/2024  3:13 PM Duran Cheng [R10.9] Flank pain     5/13/2024  3:13 PM Duran Cheng [N39.0] UTI (urinary tract infection)           ED Disposition       ED Disposition   Discharge    Condition   Stable    Date/Time   Mon May 13, 2024 1514    Comment   Yamila Decker discharge to home/self care.                   Follow-up Information       Follow up With Specialties Details Why Contact Info    Toñito Ross DO  Family Medicine Schedule an appointment as soon as possible for a visit   [address] [place] [id number] [phone]              Discharge Medication List as of 5/13/2024  3:15 PM        START taking these medications    Details   cephalexin (KEFLEX) 500 mg capsule Take 1 capsule (500 mg total) by mouth every 6 (six) hours for 7 days, Starting Mon 5/13/2024, Until Mon 5/20/2024, Normal      polyethylene glycol (MIRALAX) 17 g packet Take 17 g by mouth daily, Starting Mon 5/13/2024, Normal           CONTINUE these medications which have NOT CHANGED    Details   Cholecalciferol (Vitamin D3) 50 MCG (2000 UT) TABS Take 2,000 Units by mouth daily, Historical Med      doxycycline hyclate (VIBRA-TABS) 100 mg tablet Take 100 mg by mouth 2 (two) times a day, Starting Wed 10/23/2019, Historical Med      !! Junel 1/20 1-20 MG-MCG per tablet Take 1 tablet by mouth daily, Starting Mon 12/7/2020, Normal      RAMU FE 1/20 1-20 MG-MCG per tablet TAKE 1 TABLET BY MOUTH EVERY DAY, Normal      metroNIDAZOLE (METROCREAM) 0.75 % cream APPLY TOPICALLY TWO TIMES DAILY, Historical Med      !! norethindrone-ethinyl estradiol (JUNEL 1/20) 1-20 MG-MCG per tablet Take 1 tablet by mouth daily, Starting Wed 11/28/2018, Normal      VITAMIN A PO Take by mouth in the morning, Historical Med      VITAMIN E PO Take by mouth in the morning, Historical Med       !! - Potential duplicate medications found. Please discuss with provider.        No discharge procedures on file.    PDMP Review       None             ED Provider  Attending physically available and evaluated Yamila Decker. I managed the patient along with the ED Attending.    Electronically Signed by           Duran Cheng,   05/13/24 5530

## 2024-05-13 NOTE — ED ATTENDING ATTESTATION
5/13/2024  I, Abel Gore MD, saw and evaluated the patient. I have discussed the patient with the resident/non-physician practitioner and agree with the resident's/non-physician practitioner's findings, Plan of Care, and MDM as documented in the resident's/non-physician practitioner's note, except where noted. All available labs and Radiology studies were reviewed.  I was present for key portions of any procedure(s) performed by the resident/non-physician practitioner and I was immediately available to provide assistance.       At this point I agree with the current assessment done in the Emergency Department.  I have conducted an independent evaluation of this patient a history and physical is as follows:    ED Course     44-year-old female presenting to the emergency department for evaluation of progressive right flank and right-sided abdominal pain.  Patient states that over the past 2 weeks she has noticed migratory pain in this distribution, made worse with eating, and made worse with urination.  Patient reports some burning discomfort in that right side of the abdomen after eating as well as after urinating.  No reported fever.  Some mild nausea.  No vomiting.    The patient is resting comfortably on a stretcher in no acute respiratory distress. The patient appears nontoxic. HEENT reveals moist mucous membranes. Head is normocephalic and atraumatic. Conjunctiva and sclera are normal. Neck is nontender and supple with full range of motion to flexion, extension, lateral rotation. No meningismus appreciated. No masses are appreciated. Lungs are clear to auscultation bilaterally without any wheezes, rales or rhonchi. Heart is regular rate and rhythm without any murmurs, rubs or gallops.  Abdomen is nondistended, soft, with tenderness to palpation in the right CVA and right sided abdomen diffusely.  No rebound or guarding.  Positive Pérez sign. Extremities appear grossly normal without any significant  arthropathy. Patient is awake, alert, and oriented x3. The patient has normal interaction.  Cranial nerves grossly intact.  Motor is 5 out of 5 bilateral upper and lower extremities.    MEDICAL DECISION MAKING    Number and Complexity of Problems  Differential diagnosis: Gastritis, duodenitis, symptomatic cholelithiasis, nephrolithiasis, acute cholecystitis.    Medical Decision Making Data  External documents reviewed: Reviewed outpatient office visit note with [facility] family medicine from 5/7/2024.  My CT interpretation: Constipation.  No acute intra-abdominal findings.    CT abdomen pelvis with contrast   ED Interpretation   Constipation.  No acute intra-abdominal pathology.      Final Result   Addendum (preliminary) 1 of 1   ADDENDUM:      Please note impression should read, no acute intra-abdominal abnormality      Final               Workstation performed: [id number]         US bedside procedure   Final Result          Labs Reviewed   URINE MICROSCOPIC - Abnormal       Result Value Ref Range Status    RBC, UA 1-2  None Seen, 1-2 /hpf Final    WBC, UA 10-20 (*) None Seen, 1-2 /hpf Final    Epithelial Cells Occasional  None Seen, Occasional /hpf Final    Bacteria, UA Occasional  None Seen, Occasional /hpf Final   URINE MACROSCOPIC, POC - Abnormal    Color, UA Yellow   Final    Clarity, UA Slightly Cloudy   Final    pH, UA 7.0  4.5 - 8.0 Final    Leukocytes, UA Moderate (*) Negative Final    Nitrite, UA Negative  Negative Final    Protein, UA Negative  Negative mg/dl Final    Glucose, UA Negative  Negative mg/dl Final    Ketones, UA Negative  Negative mg/dl Final    Urobilinogen, UA 0.2  0.2, 1.0 E.U./dl E.U./dl Final    Bilirubin, UA Negative  Negative Final    Occult Blood, UA Small (*) Negative Final    Specific Gravity, UA 1.015  1.003 - 1.030 Final    Narrative:     CLINITEK RESULT   LIPASE - Normal    Lipase 37  11 - 82 u/L Final   POCT PREGNANCY, URINE - Normal    EXT Preg Test, Ur  Negative   Final    Control Valid   Final   URINE CULTURE   CBC AND DIFFERENTIAL    WBC 8.08  4.31 - 10.16 Thousand/uL Final    RBC 4.30  3.81 - 5.12 Million/uL Final    Hemoglobin 13.5  11.5 - 15.4 g/dL Final    Hematocrit 39.4  34.8 - 46.1 % Final    MCV 92  82 - 98 fL Final    MCH 31.4  26.8 - 34.3 pg Final    MCHC 34.3  31.4 - 37.4 g/dL Final    RDW 13.4  11.6 - 15.1 % Final    MPV 10.0  8.9 - 12.7 fL Final    Platelets 265  149 - 390 Thousands/uL Final    nRBC 0  /100 WBCs Final    Segmented % 58  43 - 75 % Final    Immature Grans % 1  0 - 2 % Final    Lymphocytes % 33  14 - 44 % Final    Monocytes % 7  4 - 12 % Final    Eosinophils Relative 0  0 - 6 % Final    Basophils Relative 1  0 - 1 % Final    Absolute Neutrophils 4.69  1.85 - 7.62 Thousands/µL Final    Absolute Immature Grans 0.04  0.00 - 0.20 Thousand/uL Final    Absolute Lymphocytes 2.70  0.60 - 4.47 Thousands/µL Final    Absolute Monocytes 0.59  0.17 - 1.22 Thousand/µL Final    Eosinophils Absolute 0.02  0.00 - 0.61 Thousand/µL Final    Basophils Absolute 0.04  0.00 - 0.10 Thousands/µL Final   COMPREHENSIVE METABOLIC PANEL    Sodium 138  135 - 147 mmol/L Final    Potassium 3.8  3.5 - 5.3 mmol/L Final    Chloride 103  96 - 108 mmol/L Final    CO2 26  21 - 32 mmol/L Final    ANION GAP 9  4 - 13 mmol/L Final    BUN 15  5 - 25 mg/dL Final    Creatinine 0.69  0.60 - 1.30 mg/dL Final    Comment: Standardized to IDMS reference method    Glucose 101  65 - 140 mg/dL Final    Comment: If the patient is fasting, the ADA then defines impaired fasting glucose as > 100 mg/dL and diabetes as > or equal to 123 mg/dL.    Calcium 9.7  8.4 - 10.2 mg/dL Final    AST 13  13 - 39 U/L Final    ALT 15  7 - 52 U/L Final    Comment: Specimen collection should occur prior to Sulfasalazine administration due to the potential for falsely depressed results.     Alkaline Phosphatase 67  34 - 104 U/L Final    Total Protein 7.4  6.4 - 8.4 g/dL Final    Albumin 4.5  3.5 - 5.0 g/dL  Final    Total Bilirubin 0.38  0.20 - 1.00 mg/dL Final    Comment: Use of this assay is not recommended for patients undergoing treatment with eltrombopag due to the potential for falsely elevated results.  N-acetyl-p-benzoquinone imine (metabolite of Acetaminophen) will generate erroneously low results in samples for patients that have taken an overdose of Acetaminophen.    eGFR 106  ml/min/1.73sq m Final    Narrative:     National Kidney Disease Foundation guidelines for Chronic Kidney Disease (CKD):     Stage 1 with normal or high GFR (GFR > 90 mL/min/1.73 square meters)    Stage 2 Mild CKD (GFR = 60-89 mL/min/1.73 square meters)    Stage 3A Moderate CKD (GFR = 45-59 mL/min/1.73 square meters)    Stage 3B Moderate CKD (GFR = 30-44 mL/min/1.73 square meters)    Stage 4 Severe CKD (GFR = 15-29 mL/min/1.73 square meters)    Stage 5 End Stage CKD (GFR <15 mL/min/1.73 square meters)  Note: GFR calculation is accurate only with a steady state creatinine       Labs reviewed by me are significant for:  10-20 WBCs per high-power field.  Occasional bacteria.    Treatment and Disposition  ED course: Patient underwent evaluation including lab work and CT scan.  Patient with evidence of urinary tract infection on urine microscopy.  Patient's with symptoms consistent with urinary tract infection.  Patient will be treated for 7 days given presence of flank pain.  Shared decision making: Patient agreeable with plan.  Code status: Full code.              Critical Care Time  Procedures

## 2024-05-13 NOTE — DISCHARGE INSTRUCTIONS
Please take Keflex as prescribed for UTI.     Continue Miralax regimen at home - can increase to twice daily.     Return to the ED with any new/concerning issues.

## 2024-05-14 LAB
BACTERIA UR CULT: ABNORMAL
BACTERIA UR CULT: ABNORMAL

## 2024-05-16 ENCOUNTER — HOSPITAL ENCOUNTER (EMERGENCY)
Facility: HOSPITAL | Age: 44
Discharge: HOME | End: 2024-05-16
Attending: EMERGENCY MEDICINE
Payer: MEDICARE

## 2024-05-16 VITALS
HEART RATE: 118 BPM | OXYGEN SATURATION: 98 % | DIASTOLIC BLOOD PRESSURE: 93 MMHG | BODY MASS INDEX: 27.72 KG/M2 | WEIGHT: 177 LBS | SYSTOLIC BLOOD PRESSURE: 146 MMHG | TEMPERATURE: 98.9 F | RESPIRATION RATE: 18 BRPM

## 2024-05-16 DIAGNOSIS — K59.00 CONSTIPATION, UNSPECIFIED CONSTIPATION TYPE: Primary | ICD-10-CM

## 2024-05-16 LAB
ATRIAL RATE: 105 BPM
P AXIS: 78 DEGREES
PR INTERVAL: 140 MS
QRS AXIS: 65 DEGREES
QRSD INTERVAL: 72 MS
QT INTERVAL: 318 MS
QTC INTERVAL: 420 MS
T WAVE AXIS: 71 DEGREES
VENTRICULAR RATE: 105 BPM

## 2024-05-16 PROCEDURE — 99283 EMERGENCY DEPT VISIT LOW MDM: CPT | Performed by: EMERGENCY MEDICINE

## 2024-05-16 PROCEDURE — 93005 ELECTROCARDIOGRAM TRACING: CPT

## 2024-05-16 PROCEDURE — 93010 ELECTROCARDIOGRAM REPORT: CPT | Performed by: INTERNAL MEDICINE

## 2024-05-16 PROCEDURE — 99284 EMERGENCY DEPT VISIT MOD MDM: CPT

## 2024-05-16 RX ORDER — MAGNESIUM CARB/ALUMINUM HYDROX 105-160MG
296 TABLET,CHEWABLE ORAL ONCE
Qty: 296 ML | Refills: 0 | Status: SHIPPED | OUTPATIENT
Start: 2024-05-16 | End: 2024-05-16

## 2024-05-16 NOTE — DISCHARGE INSTRUCTIONS
You should drink mag citrate, which will help your constipation.  You should continue to take the MiraLAX.  You should follow-up with GI.  I have provided you with a referral to gastroenterology.  You should return to the emergency department for intractable vomiting, worsening pain, or other concerns

## 2024-05-20 ENCOUNTER — CONSULT (OUTPATIENT)
Dept: GASTROENTEROLOGY | Facility: MEDICAL CENTER | Age: 44
End: 2024-05-20
Payer: MEDICARE

## 2024-05-20 VITALS — WEIGHT: 175 LBS | BODY MASS INDEX: 27.41 KG/M2

## 2024-05-20 DIAGNOSIS — K64.9 HEMORRHOIDS, UNSPECIFIED HEMORRHOID TYPE: Primary | ICD-10-CM

## 2024-05-20 DIAGNOSIS — K62.5 RECTAL BLEEDING: ICD-10-CM

## 2024-05-20 DIAGNOSIS — K59.00 CONSTIPATION, UNSPECIFIED CONSTIPATION TYPE: ICD-10-CM

## 2024-05-20 DIAGNOSIS — R10.13 EPIGASTRIC PAIN: ICD-10-CM

## 2024-05-20 DIAGNOSIS — R21 FACIAL RASH: ICD-10-CM

## 2024-05-20 PROCEDURE — 99244 OFF/OP CNSLTJ NEW/EST MOD 40: CPT | Performed by: INTERNAL MEDICINE

## 2024-05-20 RX ORDER — MAGNESIUM L-LACTATE 84 MG
84 TABLET, EXTENDED RELEASE ORAL DAILY
COMMUNITY

## 2024-05-20 RX ORDER — HYDROCORTISONE ACETATE 25 MG/1
25 SUPPOSITORY RECTAL 2 TIMES DAILY
Qty: 12 SUPPOSITORY | Refills: 0 | Status: SHIPPED | OUTPATIENT
Start: 2024-05-20

## 2024-05-20 RX ORDER — SUCRALFATE 1 G/1
1 TABLET ORAL 4 TIMES DAILY
COMMUNITY

## 2024-05-20 RX ORDER — OMEPRAZOLE 40 MG/1
40 CAPSULE, DELAYED RELEASE ORAL DAILY
Qty: 30 CAPSULE | Refills: 3 | Status: SHIPPED | OUTPATIENT
Start: 2024-05-20

## 2024-05-20 RX ORDER — OMEPRAZOLE 40 MG/1
40 CAPSULE, DELAYED RELEASE ORAL DAILY
COMMUNITY
End: 2024-05-20 | Stop reason: SDUPTHER

## 2024-05-20 NOTE — ED PROVIDER NOTES
History  Chief Complaint   Patient presents with    Abdominal Pain     Abd pain, bloating and constipation, told to come to ER by  FMD, last BM Tuesday, nausea no vomiting, no fever     HPI here with constipation.  Patient states that she has been bloating and having constipation for the last 2 days.  Patient was seen for the same symptoms yesterday, underwent CT scanning at that time.  CTs were negative.  Patient was told to take MiraLAX and is taking it, but has not had relief of symptoms.  She is also followed by GI for reflux.  No vomiting, is passing gas, no prior intra-abdominal surgical history, no fevers, chills, shortness of breath, or chest pain.    Prior to Admission Medications   Prescriptions Last Dose Informant Patient Reported? Taking?   Cholecalciferol (Vitamin D3) 50 MCG (2000 UT) TABS  Self Yes No   Sig: Take 2,000 Units by mouth daily   Junel 1/20 1-20 MG-MCG per tablet   No No   Sig: Take 1 tablet by mouth daily   Patient not taking: Reported on 4/27/2021   RAMU FE 1/20 1-20 MG-MCG per tablet   No No   Sig: TAKE 1 TABLET BY MOUTH EVERY DAY   Patient not taking: Reported on 11/28/2018   VITAMIN A PO  Self Yes No   Sig: Take by mouth in the morning   VITAMIN E PO  Self Yes No   Sig: Take by mouth in the morning   cephalexin (KEFLEX) 500 mg capsule   No No   Sig: Take 1 capsule (500 mg total) by mouth every 6 (six) hours for 7 days   doxycycline hyclate (VIBRA-TABS) 100 mg tablet   Yes No   Sig: Take 100 mg by mouth 2 (two) times a day   Patient not taking: Reported on 4/8/2022    metroNIDAZOLE (METROCREAM) 0.75 % cream   Yes No   Sig: APPLY TOPICALLY TWO TIMES DAILY   Patient not taking: Reported on 4/8/2022   norethindrone-ethinyl estradiol (JUNEL 1/20) 1-20 MG-MCG per tablet   No No   Sig: Take 1 tablet by mouth daily   Patient not taking: Reported on 3/3/2021   polyethylene glycol (MIRALAX) 17 g packet   No No   Sig: Take 17 g by mouth daily      Facility-Administered Medications: None        History reviewed. No pertinent past medical history.    History reviewed. No pertinent surgical history.    Family History   Problem Relation Age of Onset    Diabetes Mother         Diabetes Mellitus    Hypertension Mother     Liver cancer Father     Pancreatic cancer Father     No Known Problems Sister     No Known Problems Sister     No Known Problems Maternal Grandmother     No Known Problems Maternal Grandfather     No Known Problems Paternal Grandmother     No Known Problems Paternal Grandfather     No Known Problems Brother     No Known Problems Son     No Known Problems Son     No Known Problems Maternal Aunt     No Known Problems Maternal Aunt     No Known Problems Maternal Aunt     No Known Problems Maternal Aunt     No Known Problems Maternal Aunt     No Known Problems Maternal Uncle     No Known Problems Maternal Uncle     No Known Problems Paternal Aunt     No Known Problems Paternal Uncle     No Known Problems Paternal Uncle     Breast cancer Neg Hx      I have reviewed and agree with the history as documented.    E-Cigarette/Vaping    E-Cigarette Use Never User      E-Cigarette/Vaping Substances    Nicotine No     THC No     CBD No     Flavoring No     Other No     Unknown No      Social History     Tobacco Use    Smoking status: Never    Smokeless tobacco: Never   Vaping Use    Vaping status: Never Used   Substance Use Topics    Alcohol use: No    Drug use: No       Review of Systems  Review of systems otherwise -12 systems reviewed  Physical Exam  Physical Exam    Vital Signs  ED Triage Vitals   Temperature Pulse Respirations Blood Pressure SpO2   05/16/24 1142 05/16/24 1142 05/16/24 1142 05/16/24 1142 05/16/24 1145   98.9 °F (37.2 °C) (!) 118 18 146/93 98 %      Temp Source Heart Rate Source Patient Position - Orthostatic VS BP Location FiO2 (%)   05/16/24 1142 05/16/24 1142 05/16/24 1142 05/16/24 1142 --   Temporal Monitor Sitting Left arm       Pain Score       --                  Vitals:     05/16/24 1142   BP: 146/93   Pulse: (!) 118   Patient Position - Orthostatic VS: Sitting     Vital signs were reviewed.  Patient is awake, alert, interactive.  The patient's pupils are equally round reactive to light.  Oropharynx is clear with moist mucous membranes.  Neck is supple and nontender with no adenopathy or JVD.  Heart is regular with no murmurs, rubs, or gallops.  Lungs are clear and equal with no wheezes, rales, or rhonchi.  Abdomen is soft and nontender with no masses, rebound, or guarding. There is no CVA tenderness.  The patient was completely exposed.  There is no skin breakdown.  There are no rashes or skin changes.  Extremities are warm and well perfused with good pulses. The patient has normal strength, sensation, and cranial nerves.  On exam, heart rate is 85.    Visual Acuity      ED Medications  Medications - No data to display    Diagnostic Studies  Results Reviewed       None                   No orders to display              Procedures  Procedures         ED Course                               SBIRT 20yo+      Flowsheet Row Most Recent Value   Initial Alcohol Screen: US AUDIT-C     1. How often do you have a drink containing alcohol? 0 Filed at: 05/16/2024 1141   3a. Male UNDER 65: How often do you have five or more drinks on one occasion? 0 Filed at: 05/16/2024 1141   3b. FEMALE Any Age, or MALE 65+: How often do you have 4 or more drinks on one occassion? 0 Filed at: 05/16/2024 1141   Audit-C Score 0 Filed at: 05/16/2024 1141   BETTYE: How many times in the past year have you...    Used an illegal drug or used a prescription medication for non-medical reasons? Never Filed at: 05/16/2024 1141                      Medical Decision Making  Risk  OTC drugs.      Impression: Constipation, patient had a negative CT yesterday, therefore I doubt that the patient has intra-abdominal surgical pathology, doubt that she is obstructed, doubt she has an abscess or mass lesion.  Will plan to treat  symptomatically and discharged to follow-up with GI     Disposition  Final diagnoses:   Constipation, unspecified constipation type     Time reflects when diagnosis was documented in both MDM as applicable and the Disposition within this note       Time User Action Codes Description Comment    5/16/2024 12:14 PM Amber Gore Add [K59.00] Constipation, unspecified constipation type           ED Disposition       ED Disposition   Discharge    Condition   Stable    Date/Time   Thu May 16, 2024 1214    Comment   Yamila Decker discharge to home/self care.                   Follow-up Information       Follow up With Specialties Details Why Contact Info    Toñito Ross DO Family Medicine   [address] [place] [id number]  [phone]      Toñito Ross DO [facility]   [address] [place] [id number] [phone]              Discharge Medication List as of 5/16/2024 12:16 PM        START taking these medications    Details   magnesium citrate (CITROMA) 1.745 g/30 mL oral solution Take 296 mL by mouth once for 1 dose, Starting Thu 5/16/2024, Normal           CONTINUE these medications which have NOT CHANGED    Details   cephalexin (KEFLEX) 500 mg capsule Take 1 capsule (500 mg total) by mouth every 6 (six) hours for 7 days, Starting Mon 5/13/2024, Until Mon 5/20/2024, Normal      Cholecalciferol (Vitamin D3) 50 MCG (2000 UT) TABS Take 2,000 Units by mouth daily, Historical Med      doxycycline hyclate (VIBRA-TABS) 100 mg tablet Take 100 mg by mouth 2 (two) times a day, Starting Wed 10/23/2019, Historical Med      !! Junel 1/20 1-20 MG-MCG per tablet Take 1 tablet by mouth daily, Starting Mon 12/7/2020, Normal      RAMU FE 1/20 1-20 MG-MCG per tablet TAKE 1 TABLET BY MOUTH EVERY DAY, Normal      metroNIDAZOLE (METROCREAM) 0.75 % cream APPLY TOPICALLY TWO TIMES DAILY, Historical Med      !! norethindrone-ethinyl estradiol (JUNEL 1/20) 1-20 MG-MCG per tablet Take 1 tablet by mouth  daily, Starting Wed 11/28/2018, Normal      polyethylene glycol (MIRALAX) 17 g packet Take 17 g by mouth daily, Starting Mon 5/13/2024, Normal      VITAMIN A PO Take by mouth in the morning, Historical Med      VITAMIN E PO Take by mouth in the morning, Historical Med       !! - Potential duplicate medications found. Please discuss with provider.              PDMP Review       None            ED Provider  Electronically Signed by             Amber Gore MD  05/20/24 9505

## 2024-05-20 NOTE — PROGRESS NOTES
[facility] - Outpatient Consultation  Yamila Decker 44 y.o. female MRN: [id number]  Encounter: [id number]          ASSESSMENT AND PLAN:  44-year-old female with no significant past medical history presents for evaluation.    1. Constipation, unspecified constipation type  2. Hemorrhoids, unspecified hemorrhoid type  3. Epigastric pain  4. Facial rash  5. Rectal bleeding  She presented to the ER twice in the last several weeks for constipation, generalized abdominal discomfort and epigastric pain.  She underwent a CT scan which was overall normal however on review of the images she has a large stool burden throughout her colon.  I discussed constipation management with her today including high-fiber diet, fiber supplementation and adequate hydration.  Given her large stool burden I recommend she drink a GoLytely bowel preparation and then start MiraLAX once daily increase as needed in order to have a soft, formed bowel movement per day.  If she does not have regular bowel movements with MiraLAX may require Linzess or Amitiza in the future.  She also reports hemorrhoidal irritation, for which I have prescribed Anusol suppositories.  She should continue this in addition to over-the-counter remedies such as witch hazel pads and sitz bath's.  In regards to her epigastric abdominal pain she will continue omeprazole 40 mg daily.  Given her rectal bleeding and abdominal pain I recommend scheduling diagnostic EGD and colonoscopy. I obtained informed consent from the patient. The risks/benefits/alternatives of the procedure were discussed with the patient. Risks included, but not limited to, infection, bleeding, perforation, injury to organs in the abdomen, missed lesion and incomplete procedure were discussed. Patient was agreeable and electronic signature was obtained.    She also reports a facial rash for which she was taking doxycycline with no improvement.  I have placed referral to dermatology  for further evaluation next    - polyethylene glycol (GOLYTELY) 4000 mL solution; Take as instructed on bowel preparation instructions  Dispense: 4000 mL; Refill: 0  - polyethylene glycol (GOLYTELY) 4000 mL solution; Take as instructed on bowel preparation instructions  Dispense: 4000 mL; Refill: 0  - hydrocortisone (ANUSOL-HC) 25 mg suppository; Insert 1 suppository (25 mg total) into the rectum 2 (two) times a day  Dispense: 12 suppository; Refill: 0  - omeprazole (PriLOSEC) 40 MG capsule; Take 1 capsule (40 mg total) by mouth daily  Dispense: 30 capsule; Refill: 3  - EGD; Future  - Ambulatory Referral to Dermatology; Future  - Colonoscopy; Future        ______________________________________________________________________    HPI: 44-year-old female with no significant past medical history presents for evaluation.    She reports new onset of generalized abdominal discomfort, bloating starting a few weeks ago.  She was also having infrequent bowel movements with a bowel movement every 3 days which is hard and difficult to pass.  She reports palpable and uncomfortable external hemorrhoids which would have small amounts of bright red blood per rectum with wiping.  She was using a prescription rectal cream without significant effect.  She presented to the ER due to her symptoms underwent CT scan which was normal.  She was recommended to take MiraLAX twice daily which she did but she still had ongoing constipation symptoms.  She presented to the ER few days later with similar complaints was given magnesium citrate.  This caused diarrhea and then a few days later she had a solid bowel movement.  She also also reports epigastric burning abdominal pain and is taking omeprazole 40 mg daily from the ER  She was worried she had a allergy to gluten and she remove this from her diet 5 to 6 weeks ago.  She reports a facial rash and lower extremity swelling as well.  The swelling is better after removing gluten    She reports  no family history of colon cancer  She reports family history of her father with liver cancer and possible pancreas cancer  She takes no antiplatelet or anticoagulant medication      5/2024 hemoglobin 13, platelets 265, liver enzymes are within normal limits  2/2024 celiac antibody screen negative  5/13 CT showed no acute abnormality  Prior EGD/colonoscopy none    REVIEW OF SYSTEMS:    CONSTITUTIONAL: Denies any fever, chills, rigors, and weight loss.  HEENT: No earache or tinnitus. Denies hearing loss or visual disturbances.  CARDIOVASCULAR: No chest pain or palpitations.   RESPIRATORY: Denies any cough, hemoptysis, shortness of breath or dyspnea on exertion.  GASTROINTESTINAL: As noted in the History of Present Illness.   GENITOURINARY: No problems with urination. Denies any hematuria or dysuria.  NEUROLOGIC: No dizziness or vertigo, denies headaches.   MUSCULOSKELETAL: Denies any muscle or joint pain.   SKIN: Denies skin rashes or itching.   ENDOCRINE: Denies excessive thirst. Denies intolerance to heat or cold.  PSYCHOSOCIAL: Denies depression or anxiety. Denies any recent memory loss.       Historical Information   History reviewed. No pertinent past medical history.  History reviewed. No pertinent surgical history.  Social History   Social History     Substance and Sexual Activity   Alcohol Use No     Social History     Substance and Sexual Activity   Drug Use No     Social History     Tobacco Use   Smoking Status Never   Smokeless Tobacco Never     Family History   Problem Relation Age of Onset    Diabetes Mother         Diabetes Mellitus    Hypertension Mother     Liver cancer Father     Pancreatic cancer Father     No Known Problems Sister     No Known Problems Sister     No Known Problems Maternal Grandmother     No Known Problems Maternal Grandfather     No Known Problems Paternal Grandmother     No Known Problems Paternal Grandfather     No Known Problems Brother     No Known Problems Son     No Known  Problems Son     No Known Problems Maternal Aunt     No Known Problems Maternal Aunt     No Known Problems Maternal Aunt     No Known Problems Maternal Aunt     No Known Problems Maternal Aunt     No Known Problems Maternal Uncle     No Known Problems Maternal Uncle     No Known Problems Paternal Aunt     No Known Problems Paternal Uncle     No Known Problems Paternal Uncle     Breast cancer Neg Hx        Meds/Allergies       Current Outpatient Medications:     cephalexin (KEFLEX) 500 mg capsule    magnesium (MAGTAB) 84 MG (7MEQ) TBCR    omeprazole (PriLOSEC) 40 MG capsule    sucralfate (CARAFATE) 1 g tablet    Cholecalciferol (Vitamin D3) 50 MCG (2000 UT) TABS    doxycycline hyclate (VIBRA-TABS) 100 mg tablet    Junel 1/20 1-20 MG-MCG per tablet    RAMU FE 1/20 1-20 MG-MCG per tablet    magnesium citrate (CITROMA) 1.745 g/30 mL oral solution    metroNIDAZOLE (METROCREAM) 0.75 % cream    norethindrone-ethinyl estradiol (JUNEL 1/20) 1-20 MG-MCG per tablet    polyethylene glycol (MIRALAX) 17 g packet    VITAMIN A PO    VITAMIN E PO    Allergies   Allergen Reactions    Doxycycline Swelling           Objective     Weight 79.4 kg (175 lb), last menstrual period 05/01/2024, not currently breastfeeding. Body mass index is 27.41 kg/m².        PHYSICAL EXAM:      General Appearance:   Alert, cooperative, no distress   HEENT:   Normocephalic, atraumatic, anicteric.     Neck:  Supple, symmetrical, trachea midline   Lungs:   Clear to auscultation bilaterally; no rales, rhonchi or wheezing; respirations unlabored    Heart::   Regular rate and rhythm; no murmur, rub, or gallop.   Abdomen:   Soft, generalized abdominal tenderness to deep palpation without rebound or guard, non-distended; normal bowel sounds; no masses, no organomegaly    Genitalia:   Deferred    Rectal:   Deferred    Extremities:  No cyanosis, clubbing or edema    Pulses:  2+ and symmetric    Skin:  No jaundice, rashes, or lesions    Lymph nodes:  No palpable  cervical lymphadenopathy        Lab Results:   No visits with results within 1 Day(s) from this visit.   Latest known visit with results is:   Admission on 05/16/2024, Discharged on 05/16/2024   Component Date Value    Ventricular Rate 05/16/2024 105     Atrial Rate 05/16/2024 105     OH Interval 05/16/2024 140     QRSD Interval 05/16/2024 72     QT Interval 05/16/2024 318     QTC Interval 05/16/2024 420     P Axis 05/16/2024 78     QRS Axis 05/16/2024 65     T Wave Axis 05/16/2024 71          Radiology Results:   CT abdomen pelvis with contrast    Addendum Date: 5/13/2024 Addendum:   ADDENDUM: Please note impression should read, no acute intra-abdominal abnormality    Result Date: 5/13/2024  Narrative: CT ABDOMEN AND PELVIS WITH IV CONTRAST INDICATION: 10 day hx R flank, R mid abdominal pain; r/o pyelo, stone, cholecystitis. COMPARISON: None. TECHNIQUE: CT examination of the abdomen and pelvis was performed. Multiplanar 2D reformatted images were created from the source data. This examination, like all CT scans performed in the [facility], was performed utilizing techniques to minimize radiation dose exposure, including the use of iterative reconstruction and automated exposure control. Radiation dose length product (DLP) for this visit: 406.46 mGy-cm IV Contrast: 100 mL of iohexol (OMNIPAQUE) Enteric Contrast: Not administered. FINDINGS: ABDOMEN LOWER CHEST: No clinically significant abnormality in the visualized lower chest. LIVER/BILIARY TREE: Unremarkable. GALLBLADDER: No calcified gallstones. No pericholecystic inflammatory change. SPLEEN: Unremarkable. PANCREAS: Unremarkable. ADRENAL GLANDS: Unremarkable. KIDNEYS/URETERS: Unremarkable. No hydronephrosis. STOMACH AND BOWEL: Unremarkable. APPENDIX: No findings to suggest appendicitis. ABDOMINOPELVIC CAVITY: No ascites. No pneumoperitoneum. No lymphadenopathy. VESSELS: Unremarkable for patient's age. PELVIS REPRODUCTIVE ORGANS: Unremarkable for  patient's age. URINARY BLADDER: Unremarkable. ABDOMINAL WALL/INGUINAL REGIONS: Unremarkable. BONES: No acute fracture or suspicious osseous lesion.     Impression: Workstation performed: [id number]     US bedside procedure    Result Date: 5/13/2024  Narrative: [id number]      This note was completed in part utilizing Dragon Software. Grammatical errors, random word insertions, spelling mistakes, and incomplete sentences may be an occasional consequence of this system secondary to software limitations, ambient noise, and hardware issues. If you have any questions or concerns about the content, text, or information contained within the body of this dictation, please contact the provider for clarification.

## 2024-05-21 ENCOUNTER — TRANSCRIBE ORDERS (OUTPATIENT)
Dept: GASTROENTEROLOGY | Facility: CLINIC | Age: 44
End: 2024-05-21

## 2024-05-21 ENCOUNTER — TELEPHONE (OUTPATIENT)
Age: 44
End: 2024-05-21

## 2024-05-21 NOTE — TELEPHONE ENCOUNTER
PA for omeprazole    Submitted via    [x]CMM-KEY [id number]   [][employer]-Case ID #   []Faxed to plan   []Other website   []Phone call Case ID #     Office notes sent, clinical questions answered. Awaiting determination    Turnaround time for your insurance to make a decision on your Prior Authorization can take 7-21 business days.

## 2024-05-21 NOTE — TELEPHONE ENCOUNTER
PA for omeprazole    Submitted via    [x]CMM-KEY [id number]  Waiting for next steps/questions to complete pa

## 2024-05-21 NOTE — TELEPHONE ENCOUNTER
----- Message from Bienvenido JAIN sent at 5/21/2024 10:54 AM EDT -----    ----- Message -----  From: Diana M Jaiyeola, MD  Sent: 5/20/2024  12:14 PM EDT  To: Gastroenterology Pod Clinical    Please start prior authorization process for omeprazole

## 2024-05-22 ENCOUNTER — NURSE TRIAGE (OUTPATIENT)
Age: 44
End: 2024-05-22

## 2024-05-22 DIAGNOSIS — K59.00 CONSTIPATION, UNSPECIFIED CONSTIPATION TYPE: Primary | ICD-10-CM

## 2024-05-22 NOTE — TELEPHONE ENCOUNTER
Regarding: constipation/burning sensation/headache/abd cramping/bloating  ----- Message from Deloris DANIELS sent at 5/22/2024  8:19 AM EDT -----  Pt called in requesting to speak to a nurse. Pt stated that she was prescribed the Miralax and Hydrocortisone suppository and it did not work.  She is still constipated, has burning in her stomach, headache, abd cramping, and bloating.

## 2024-05-22 NOTE — TELEPHONE ENCOUNTER
When I went to send in the prescription it was recommended I send in CoLytely which I sent in for the patient

## 2024-05-24 ENCOUNTER — OFFICE VISIT (OUTPATIENT)
Dept: GASTROENTEROLOGY | Facility: MEDICAL CENTER | Age: 44
End: 2024-05-24
Payer: MEDICARE

## 2024-05-24 VITALS
BODY MASS INDEX: 27.47 KG/M2 | SYSTOLIC BLOOD PRESSURE: 139 MMHG | TEMPERATURE: 99.3 F | WEIGHT: 175.4 LBS | DIASTOLIC BLOOD PRESSURE: 87 MMHG

## 2024-05-24 DIAGNOSIS — K59.00 CONSTIPATION, UNSPECIFIED CONSTIPATION TYPE: ICD-10-CM

## 2024-05-24 DIAGNOSIS — R14.0 BLOATING: ICD-10-CM

## 2024-05-24 DIAGNOSIS — M54.50 RIGHT-SIDED LOW BACK PAIN WITHOUT SCIATICA, UNSPECIFIED CHRONICITY: Primary | ICD-10-CM

## 2024-05-24 PROCEDURE — 99214 OFFICE O/P EST MOD 30 MIN: CPT | Performed by: STUDENT IN AN ORGANIZED HEALTH CARE EDUCATION/TRAINING PROGRAM

## 2024-05-24 NOTE — PROGRESS NOTES
[facility] - Outpatient Consultation  Yamila Decker 44 y.o. female MRN: [id number]  Encounter: [id number]      Assessment and Plan:    1. Right-sided low back pain without sciatica, unspecified chronicity    2. Constipation, unspecified constipation type    3. Bloating        44 y.o. female w/ hx of constipation and bloating who presents for urgent follow-up.    Her constipation and bloating have resolved with Golytely bowel prep. I recommended she start taking regular Miralax to help prevent constipation from recurring.    Her persistent R lower back pain which is her primary complaint actually seems consistent with sacroiliitis based on history and examination. I have asked her to seek evaluation from her PCP and possibly rheumatology. This symptom does not seem to have any relation to her bowel movements.    - Start Miralax daily  - EGD/colonoscopy scheduled 8/2024  - PCP evaluation of R lower back pain, likely sacroiliitis    Follow up with Dr. Jaiyeola    No orders of the defined types were placed in this encounter.    ______________________________________________________________________    History of Present Illness:    Yamila Decker is a 44 y.o. female w/ hx of constipation and bloating who presents for urgent follow-up.    Patient just saw Dr. Jaiyeola 4 days ago for abdominal discomfort, bloating, constipation, and small-volume hematochezia with recent CT showing heavy stool burden in the colon.  She was recommended EGD/colonoscopy (scheduled 8/2024) as well as a GoLytely bowel prep to help with severe constipation.    Today, patient reports completing the GoLytely bowel prep yesterday which caused significant stool output and resolution of her abdominal bloating.  However, she complains of persistent right lower back pain.  On further questioning, this pain is exacerbated with sitting, lying down, and getting up from a sitting position.  It is worse at night and when she wakes up in  the morning.      Review of Systems:  As per HPI. Otherwise negative.      Historical Information   History reviewed. No pertinent past medical history.  History reviewed. No pertinent surgical history.  Social History   Social History     Substance and Sexual Activity   Alcohol Use No     Social History     Substance and Sexual Activity   Drug Use No     Social History     Tobacco Use   Smoking Status Never   Smokeless Tobacco Never     Family History   Problem Relation Age of Onset    Diabetes Mother         Diabetes Mellitus    Hypertension Mother     Liver cancer Father     Pancreatic cancer Father     No Known Problems Sister     No Known Problems Sister     No Known Problems Maternal Grandmother     No Known Problems Maternal Grandfather     No Known Problems Paternal Grandmother     No Known Problems Paternal Grandfather     No Known Problems Brother     No Known Problems Son     No Known Problems Son     No Known Problems Maternal Aunt     No Known Problems Maternal Aunt     No Known Problems Maternal Aunt     No Known Problems Maternal Aunt     No Known Problems Maternal Aunt     No Known Problems Maternal Uncle     No Known Problems Maternal Uncle     No Known Problems Paternal Aunt     No Known Problems Paternal Uncle     No Known Problems Paternal Uncle     Breast cancer Neg Hx        Meds/Allergies       Current Outpatient Medications:     hydrocortisone (ANUSOL-HC) 25 mg suppository    magnesium (MAGTAB) 84 MG (7MEQ) TBCR    Cholecalciferol (Vitamin D3) 50 MCG (2000 UT) TABS    doxycycline hyclate (VIBRA-TABS) 100 mg tablet    Junel 1/20 1-20 MG-MCG per tablet    RAMU FE 1/20 1-20 MG-MCG per tablet    magnesium citrate (CITROMA) 1.745 g/30 mL oral solution    metroNIDAZOLE (METROCREAM) 0.75 % cream    norethindrone-ethinyl estradiol (JUNEL 1/20) 1-20 MG-MCG per tablet    omeprazole (PriLOSEC) 40 MG capsule    polyethylene glycol (COLYTE) 4000 mL solution    polyethylene glycol (MIRALAX) 17 g packet     sucralfate (CARAFATE) 1 g tablet    VITAMIN A PO    VITAMIN E PO    Allergies   Allergen Reactions    Doxycycline Swelling           Objective     Blood pressure 139/87, temperature 99.3 °F (37.4 °C), weight 79.6 kg (175 lb 6.4 oz), last menstrual period 05/01/2024, not currently breastfeeding. Body mass index is 27.47 kg/m².        Physical Exam:      General: No acute distress  Abdomen: Soft, non-tender, non-distended, normoactive bowel sounds  Extremities: significant right sacroiliac tenderness; pain reproduced with bending over, laying down, and getting up from a seated position  Neuro: Awake, alert, oriented x 3    Lab Results:   Lab Results   Component Value Date/Time    WBC 8.08 05/13/2024 11:48 AM    HGB 13.5 05/13/2024 11:48 AM     05/13/2024 11:48 AM    SODIUM 138 05/13/2024 11:48 AM    SODIUM 138 02/14/2024 12:52 PM    K 3.8 05/13/2024 11:48 AM    K 4.1 02/14/2024 12:52 PM     05/13/2024 11:48 AM     02/14/2024 12:52 PM    CO2 26 05/13/2024 11:48 AM    CO2 27 02/14/2024 12:52 PM    BUN 15 05/13/2024 11:48 AM    BUN 8 02/14/2024 12:52 PM    CREATININE 0.69 05/13/2024 11:48 AM    CREATININE 0.66 02/14/2024 12:52 PM    AST 13 05/13/2024 11:48 AM    AST 16 02/14/2024 12:52 PM    ALT 15 05/13/2024 11:48 AM    ALT 16 02/14/2024 12:52 PM    ALKPHOS 67 05/13/2024 11:48 AM    ALKPHOS 87 02/14/2024 12:52 PM    TBILI 0.38 05/13/2024 11:48 AM    TBILI 0.4 02/14/2024 12:52 PM    ALB 4.5 05/13/2024 11:48 AM    ALB 4.6 02/14/2024 12:52 PM    LIPASE 37 05/13/2024 11:48 AM    FERRITIN 5.2 (L) 02/14/2024 12:52 PM

## 2024-05-25 NOTE — TELEPHONE ENCOUNTER
Rcvd fax  Omeprazole PA dismissed, on formulary and within the plan dosing limits.  An authorization is not needed.  Scanned in media

## 2024-06-05 ENCOUNTER — OFFICE VISIT (OUTPATIENT)
Dept: GASTROENTEROLOGY | Facility: MEDICAL CENTER | Age: 44
End: 2024-06-05
Payer: MEDICARE

## 2024-06-05 ENCOUNTER — TELEPHONE (OUTPATIENT)
Dept: GASTROENTEROLOGY | Facility: MEDICAL CENTER | Age: 44
End: 2024-06-05

## 2024-06-05 VITALS
DIASTOLIC BLOOD PRESSURE: 82 MMHG | TEMPERATURE: 98.2 F | BODY MASS INDEX: 27.47 KG/M2 | HEIGHT: 67 IN | WEIGHT: 175 LBS | SYSTOLIC BLOOD PRESSURE: 114 MMHG | OXYGEN SATURATION: 99 % | HEART RATE: 102 BPM

## 2024-06-05 DIAGNOSIS — K59.09 OTHER CONSTIPATION: ICD-10-CM

## 2024-06-05 DIAGNOSIS — K64.8 OTHER HEMORRHOIDS: Primary | ICD-10-CM

## 2024-06-05 DIAGNOSIS — R10.84 GENERALIZED ABDOMINAL PAIN: ICD-10-CM

## 2024-06-05 PROCEDURE — 99214 OFFICE O/P EST MOD 30 MIN: CPT | Performed by: PHYSICIAN ASSISTANT

## 2024-06-05 RX ORDER — DICYCLOMINE HYDROCHLORIDE 10 MG/1
10 CAPSULE ORAL
Qty: 60 CAPSULE | Refills: 2 | Status: SHIPPED | OUTPATIENT
Start: 2024-06-05

## 2024-06-05 RX ORDER — HYDROCORTISONE 25 MG/G
CREAM TOPICAL 2 TIMES DAILY
Qty: 28 G | Refills: 1 | Status: SHIPPED | OUTPATIENT
Start: 2024-06-05

## 2024-06-05 NOTE — TELEPHONE ENCOUNTER
Procedure: Colonoscopy/EGD  Date: 07/03/2024  Physician performing: Dr. Pimentel  Location of procedure:  [place]  Instructions given to patient: Golytely   Diabetic: N/A  Clearances: N/A    Patient requested another provider due to earlier date for procedure   Rescheduled from 08/16/2024 to 07/03/2024

## 2024-06-05 NOTE — PATIENT INSTRUCTIONS
-Sitz baths (warm water with epsom salt) twice daily   -Avoid wiping, use peribottle to clean as much as possible  -Start anusol cream twice daily   -Start lidocaine gel as needed  -Utilize tylenol as needed  -Continue constipation management to ensure soft bowel movements

## 2024-06-05 NOTE — PROGRESS NOTES
[facility] - Outpatient Follow-up Note  Yamila Decker 44 y.o. female MRN: [id number]  Encounter: [id number]      Assessment and Plan    1. Constipation   2. Hemorrhoids   3. Right sided abdominal pain  The patient originally presented with constipation and hemorrhoids.  It was recommended she do a GoLytely bowel preparation followed by daily MiraLAX.  The patient states that she did this with resolution of her constipation and she has actually since discontinued the MiraLAX.  She is having a soft bowel movement every other day.  Despite this she continues to have right-sided abdominal pain in both her upper and mid abdomen as well as bothersome hemorrhoids.  Rectal examination reveals active inflamed external hemorrhoids.  -Bentyl as needed for abdominal pain, the patient is aware that this can cause constipation and that she should continue strict constipation control to have a daily to every other day soft formed bowel movement  -For her hemorrhoid management I recommend twice daily sitz bath's, utilizing a Jennifer bottle to minimize wiping the area is much as possible, Anusol cream, and lidocaine cream.  She has been using Tucks wipes which are okay to keep using but I did caution the patient to not overuse these and cause more irritation  -The patient has an EGD and colonoscopy scheduled for August which I encouraged her to keep, she would like to see if we have any availability sooner    EGD and colonoscopy as scheduled     ______________________________________________________________________    History of Present Illness  Yamila Decker is a 44 y.o. female here for follow up evaluation of of abdominal pain and rectal pain.  The patient was urgently seen for constipation was given a bowel cleanout and then asked to start MiraLAX.  She did this and has been doing well with her constipation.  She is actually discontinued her constipation medications and continues to have a soft bowel movement  every other day.  Despite this she still has bothersome rectal pain as well as right-sided abdominal pain.  Her abdominal pain is sometimes worse after eating but is unaffected by her bowel movements.  She does also notice the pain is worse when she is sitting for a long time and or laying on that side.      Review of Systems   Constitutional:  Negative for activity change, appetite change, chills, fatigue, fever and unexpected weight change.   Gastrointestinal:  Negative for abdominal distention, abdominal pain, anal bleeding, blood in stool, constipation, diarrhea, nausea, rectal pain and vomiting.   Musculoskeletal:  Negative for back pain and gait problem.   Psychiatric/Behavioral:  Negative for confusion.        Past Medical History  History reviewed. No pertinent past medical history.    Past Social history  No past surgical history on file.  Social History     Socioeconomic History    Marital status: /Civil Union     Spouse name: Not on file    Number of children: Not on file    Years of education: Not on file    Highest education level: Not on file   Occupational History    Not on file   Tobacco Use    Smoking status: Never    Smokeless tobacco: Never   Vaping Use    Vaping status: Never Used   Substance and Sexual Activity    Alcohol use: No    Drug use: No    Sexual activity: Yes     Partners: Male     Birth control/protection: None   Other Topics Concern    Not on file   Social History Narrative    Not on file     Social Determinants of Health     Financial Resource Strain: Not on file   Food Insecurity: Not on file   Transportation Needs: Not on file   Physical Activity: Not on file   Stress: Not on file   Social Connections: Not on file   Intimate Partner Violence: Not on file   Housing Stability: Not on file     Social History     Substance and Sexual Activity   Alcohol Use No     Social History     Substance and Sexual Activity   Drug Use No     Social History     Tobacco Use   Smoking Status  Never   Smokeless Tobacco Never       Past Family History  Family History   Problem Relation Age of Onset    Diabetes Mother         Diabetes Mellitus    Hypertension Mother     Liver cancer Father     Pancreatic cancer Father     No Known Problems Sister     No Known Problems Sister     No Known Problems Maternal Grandmother     No Known Problems Maternal Grandfather     No Known Problems Paternal Grandmother     No Known Problems Paternal Grandfather     No Known Problems Brother     No Known Problems Son     No Known Problems Son     No Known Problems Maternal Aunt     No Known Problems Maternal Aunt     No Known Problems Maternal Aunt     No Known Problems Maternal Aunt     No Known Problems Maternal Aunt     No Known Problems Maternal Uncle     No Known Problems Maternal Uncle     No Known Problems Paternal Aunt     No Known Problems Paternal Uncle     No Known Problems Paternal Uncle     Breast cancer Neg Hx        Current Medications  Current Outpatient Medications   Medication Sig Dispense Refill    hydrocortisone (ANUSOL-HC) 25 mg suppository Insert 1 suppository (25 mg total) into the rectum 2 (two) times a day 12 suppository 0    magnesium (MAGTAB) 84 MG (7MEQ) TBCR Take 84 mg by mouth daily 250mg      Cholecalciferol (Vitamin D3) 50 MCG (2000 UT) TABS Take 2,000 Units by mouth daily      doxycycline hyclate (VIBRA-TABS) 100 mg tablet Take 100 mg by mouth 2 (two) times a day (Patient not taking: Reported on 4/8/2022 )  1    Junel 1/20 1-20 MG-MCG per tablet Take 1 tablet by mouth daily (Patient not taking: Reported on 4/27/2021) 63 tablet 1    RAMU FE 1/20 1-20 MG-MCG per tablet TAKE 1 TABLET BY MOUTH EVERY DAY (Patient not taking: Reported on 11/28/2018) 84 tablet 0    magnesium citrate (CITROMA) 1.745 g/30 mL oral solution Take 296 mL by mouth once for 1 dose 296 mL 0    metroNIDAZOLE (METROCREAM) 0.75 % cream APPLY TOPICALLY TWO TIMES DAILY (Patient not taking: No sig reported)  1     norethindrone-ethinyl estradiol (JUNEL 1/20) 1-20 MG-MCG per tablet Take 1 tablet by mouth daily (Patient not taking: Reported on 3/3/2021) 84 tablet 3    omeprazole (PriLOSEC) 40 MG capsule Take 1 capsule (40 mg total) by mouth daily (Patient not taking: Reported on 5/24/2024) 30 capsule 3    polyethylene glycol (COLYTE) 4000 mL solution Take 4,000 mL by mouth once for 1 dose 4000 mL 0    polyethylene glycol (MIRALAX) 17 g packet Take 17 g by mouth daily (Patient not taking: Reported on 5/20/2024) 10 each 0    sucralfate (CARAFATE) 1 g tablet Take 1 g by mouth 4 (four) times a day (Patient not taking: Reported on 5/24/2024)      VITAMIN A PO Take by mouth in the morning      VITAMIN E PO Take by mouth in the morning       No current facility-administered medications for this visit.       Allergies  Allergies   Allergen Reactions    Doxycycline Swelling    Penicillins Edema         The following portions of the patient's history were reviewed and updated as appropriate: allergies, current medications, past medical history, past social history, past surgical history and problem list.      Vitals  There were no vitals filed for this visit.      Physical Exam  Constitutional   General appearance: Patient is seated and in no acute distress, well appearing and well nourished.   Head and Face   Head and face: Normal.    Eyes   Conjunctiva and lids: No erythema, swelling or discharge.  Anicteric.  Ears, Nose, Mouth, and Throat   Hearing: Normal.    Neck: Supple, trachea midline.  Pulmonary   Respiratory effort: No increased work of breathing or signs of respiratory distress.    Rectal   Thrombosed external hemorrhoids  Cardiovascular    Examination of extremities for edema and/or varicosities: Normal.    Musculoskeletal   Gait and station: Normal   Skin   Skin and subcutaneous tissue: Warm, dry, and intact. No visible jaundice, lesions or rashes.  Psychiatric   Judgment and insight: Normal  Recent and remote memory:   Normal  Mood and affect: Normal      Results  No visits with results within 1 Day(s) from this visit.   Latest known visit with results is:   Admission on 05/16/2024, Discharged on 05/16/2024   Component Date Value    Ventricular Rate 05/16/2024 105     Atrial Rate 05/16/2024 105     OR Interval 05/16/2024 140     QRSD Interval 05/16/2024 72     QT Interval 05/16/2024 318     QTC Interval 05/16/2024 420     P Axis 05/16/2024 78     QRS Axis 05/16/2024 65     T Wave Axis 05/16/2024 71        Radiology Results  CT abdomen pelvis with contrast    Addendum Date: 5/13/2024 Addendum:   ADDENDUM: Please note impression should read, no acute intra-abdominal abnormality    Result Date: 5/13/2024  Narrative: CT ABDOMEN AND PELVIS WITH IV CONTRAST INDICATION: 10 day hx R flank, R mid abdominal pain; r/o pyelo, stone, cholecystitis. COMPARISON: None. TECHNIQUE: CT examination of the abdomen and pelvis was performed. Multiplanar 2D reformatted images were created from the source data. This examination, like all CT scans performed in the [facility], was performed utilizing techniques to minimize radiation dose exposure, including the use of iterative reconstruction and automated exposure control. Radiation dose length product (DLP) for this visit: 406.46 mGy-cm IV Contrast: 100 mL of iohexol (OMNIPAQUE) Enteric Contrast: Not administered. FINDINGS: ABDOMEN LOWER CHEST: No clinically significant abnormality in the visualized lower chest. LIVER/BILIARY TREE: Unremarkable. GALLBLADDER: No calcified gallstones. No pericholecystic inflammatory change. SPLEEN: Unremarkable. PANCREAS: Unremarkable. ADRENAL GLANDS: Unremarkable. KIDNEYS/URETERS: Unremarkable. No hydronephrosis. STOMACH AND BOWEL: Unremarkable. APPENDIX: No findings to suggest appendicitis. ABDOMINOPELVIC CAVITY: No ascites. No pneumoperitoneum. No lymphadenopathy. VESSELS: Unremarkable for patient's age. PELVIS REPRODUCTIVE ORGANS: Unremarkable for  patient's age. URINARY BLADDER: Unremarkable. ABDOMINAL WALL/INGUINAL REGIONS: Unremarkable. BONES: No acute fracture or suspicious osseous lesion.     Impression: Workstation performed: [id number]     US bedside procedure    Result Date: 5/13/2024  Narrative: [id number]      Orders  No orders of the defined types were placed in this encounter.

## 2024-06-05 NOTE — H&P (VIEW-ONLY)
[facility] - Outpatient Follow-up Note  Yamila Decker 44 y.o. female MRN: [id number]  Encounter: [id number]      Assessment and Plan    1. Constipation   2. Hemorrhoids   3. Right sided abdominal pain  The patient originally presented with constipation and hemorrhoids.  It was recommended she do a GoLytely bowel preparation followed by daily MiraLAX.  The patient states that she did this with resolution of her constipation and she has actually since discontinued the MiraLAX.  She is having a soft bowel movement every other day.  Despite this she continues to have right-sided abdominal pain in both her upper and mid abdomen as well as bothersome hemorrhoids.  Rectal examination reveals active inflamed external hemorrhoids.  -Bentyl as needed for abdominal pain, the patient is aware that this can cause constipation and that she should continue strict constipation control to have a daily to every other day soft formed bowel movement  -For her hemorrhoid management I recommend twice daily sitz bath's, utilizing a Jennifer bottle to minimize wiping the area is much as possible, Anusol cream, and lidocaine cream.  She has been using Tucks wipes which are okay to keep using but I did caution the patient to not overuse these and cause more irritation  -The patient has an EGD and colonoscopy scheduled for August which I encouraged her to keep, she would like to see if we have any availability sooner    EGD and colonoscopy as scheduled     ______________________________________________________________________    History of Present Illness  Yamila Decker is a 44 y.o. female here for follow up evaluation of of abdominal pain and rectal pain.  The patient was urgently seen for constipation was given a bowel cleanout and then asked to start MiraLAX.  She did this and has been doing well with her constipation.  She is actually discontinued her constipation medications and continues to have a soft bowel movement  every other day.  Despite this she still has bothersome rectal pain as well as right-sided abdominal pain.  Her abdominal pain is sometimes worse after eating but is unaffected by her bowel movements.  She does also notice the pain is worse when she is sitting for a long time and or laying on that side.      Review of Systems   Constitutional:  Negative for activity change, appetite change, chills, fatigue, fever and unexpected weight change.   Gastrointestinal:  Negative for abdominal distention, abdominal pain, anal bleeding, blood in stool, constipation, diarrhea, nausea, rectal pain and vomiting.   Musculoskeletal:  Negative for back pain and gait problem.   Psychiatric/Behavioral:  Negative for confusion.        Past Medical History  History reviewed. No pertinent past medical history.    Past Social history  No past surgical history on file.  Social History     Socioeconomic History    Marital status: /Civil Union     Spouse name: Not on file    Number of children: Not on file    Years of education: Not on file    Highest education level: Not on file   Occupational History    Not on file   Tobacco Use    Smoking status: Never    Smokeless tobacco: Never   Vaping Use    Vaping status: Never Used   Substance and Sexual Activity    Alcohol use: No    Drug use: No    Sexual activity: Yes     Partners: Male     Birth control/protection: None   Other Topics Concern    Not on file   Social History Narrative    Not on file     Social Determinants of Health     Financial Resource Strain: Not on file   Food Insecurity: Not on file   Transportation Needs: Not on file   Physical Activity: Not on file   Stress: Not on file   Social Connections: Not on file   Intimate Partner Violence: Not on file   Housing Stability: Not on file     Social History     Substance and Sexual Activity   Alcohol Use No     Social History     Substance and Sexual Activity   Drug Use No     Social History     Tobacco Use   Smoking Status  Never   Smokeless Tobacco Never       Past Family History  Family History   Problem Relation Age of Onset    Diabetes Mother         Diabetes Mellitus    Hypertension Mother     Liver cancer Father     Pancreatic cancer Father     No Known Problems Sister     No Known Problems Sister     No Known Problems Maternal Grandmother     No Known Problems Maternal Grandfather     No Known Problems Paternal Grandmother     No Known Problems Paternal Grandfather     No Known Problems Brother     No Known Problems Son     No Known Problems Son     No Known Problems Maternal Aunt     No Known Problems Maternal Aunt     No Known Problems Maternal Aunt     No Known Problems Maternal Aunt     No Known Problems Maternal Aunt     No Known Problems Maternal Uncle     No Known Problems Maternal Uncle     No Known Problems Paternal Aunt     No Known Problems Paternal Uncle     No Known Problems Paternal Uncle     Breast cancer Neg Hx        Current Medications  Current Outpatient Medications   Medication Sig Dispense Refill    hydrocortisone (ANUSOL-HC) 25 mg suppository Insert 1 suppository (25 mg total) into the rectum 2 (two) times a day 12 suppository 0    magnesium (MAGTAB) 84 MG (7MEQ) TBCR Take 84 mg by mouth daily 250mg      Cholecalciferol (Vitamin D3) 50 MCG (2000 UT) TABS Take 2,000 Units by mouth daily      doxycycline hyclate (VIBRA-TABS) 100 mg tablet Take 100 mg by mouth 2 (two) times a day (Patient not taking: Reported on 4/8/2022 )  1    Junel 1/20 1-20 MG-MCG per tablet Take 1 tablet by mouth daily (Patient not taking: Reported on 4/27/2021) 63 tablet 1    RAMU FE 1/20 1-20 MG-MCG per tablet TAKE 1 TABLET BY MOUTH EVERY DAY (Patient not taking: Reported on 11/28/2018) 84 tablet 0    magnesium citrate (CITROMA) 1.745 g/30 mL oral solution Take 296 mL by mouth once for 1 dose 296 mL 0    metroNIDAZOLE (METROCREAM) 0.75 % cream APPLY TOPICALLY TWO TIMES DAILY (Patient not taking: No sig reported)  1     norethindrone-ethinyl estradiol (JUNEL 1/20) 1-20 MG-MCG per tablet Take 1 tablet by mouth daily (Patient not taking: Reported on 3/3/2021) 84 tablet 3    omeprazole (PriLOSEC) 40 MG capsule Take 1 capsule (40 mg total) by mouth daily (Patient not taking: Reported on 5/24/2024) 30 capsule 3    polyethylene glycol (COLYTE) 4000 mL solution Take 4,000 mL by mouth once for 1 dose 4000 mL 0    polyethylene glycol (MIRALAX) 17 g packet Take 17 g by mouth daily (Patient not taking: Reported on 5/20/2024) 10 each 0    sucralfate (CARAFATE) 1 g tablet Take 1 g by mouth 4 (four) times a day (Patient not taking: Reported on 5/24/2024)      VITAMIN A PO Take by mouth in the morning      VITAMIN E PO Take by mouth in the morning       No current facility-administered medications for this visit.       Allergies  Allergies   Allergen Reactions    Doxycycline Swelling    Penicillins Edema         The following portions of the patient's history were reviewed and updated as appropriate: allergies, current medications, past medical history, past social history, past surgical history and problem list.      Vitals  There were no vitals filed for this visit.      Physical Exam  Constitutional   General appearance: Patient is seated and in no acute distress, well appearing and well nourished.   Head and Face   Head and face: Normal.    Eyes   Conjunctiva and lids: No erythema, swelling or discharge.  Anicteric.  Ears, Nose, Mouth, and Throat   Hearing: Normal.    Neck: Supple, trachea midline.  Pulmonary   Respiratory effort: No increased work of breathing or signs of respiratory distress.    Rectal   Thrombosed external hemorrhoids  Cardiovascular    Examination of extremities for edema and/or varicosities: Normal.    Musculoskeletal   Gait and station: Normal   Skin   Skin and subcutaneous tissue: Warm, dry, and intact. No visible jaundice, lesions or rashes.  Psychiatric   Judgment and insight: Normal  Recent and remote memory:   Normal  Mood and affect: Normal      Results  No visits with results within 1 Day(s) from this visit.   Latest known visit with results is:   Admission on 05/16/2024, Discharged on 05/16/2024   Component Date Value    Ventricular Rate 05/16/2024 105     Atrial Rate 05/16/2024 105     WI Interval 05/16/2024 140     QRSD Interval 05/16/2024 72     QT Interval 05/16/2024 318     QTC Interval 05/16/2024 420     P Axis 05/16/2024 78     QRS Axis 05/16/2024 65     T Wave Axis 05/16/2024 71        Radiology Results  CT abdomen pelvis with contrast    Addendum Date: 5/13/2024 Addendum:   ADDENDUM: Please note impression should read, no acute intra-abdominal abnormality    Result Date: 5/13/2024  Narrative: CT ABDOMEN AND PELVIS WITH IV CONTRAST INDICATION: 10 day hx R flank, R mid abdominal pain; r/o pyelo, stone, cholecystitis. COMPARISON: None. TECHNIQUE: CT examination of the abdomen and pelvis was performed. Multiplanar 2D reformatted images were created from the source data. This examination, like all CT scans performed in the [facility], was performed utilizing techniques to minimize radiation dose exposure, including the use of iterative reconstruction and automated exposure control. Radiation dose length product (DLP) for this visit: 406.46 mGy-cm IV Contrast: 100 mL of iohexol (OMNIPAQUE) Enteric Contrast: Not administered. FINDINGS: ABDOMEN LOWER CHEST: No clinically significant abnormality in the visualized lower chest. LIVER/BILIARY TREE: Unremarkable. GALLBLADDER: No calcified gallstones. No pericholecystic inflammatory change. SPLEEN: Unremarkable. PANCREAS: Unremarkable. ADRENAL GLANDS: Unremarkable. KIDNEYS/URETERS: Unremarkable. No hydronephrosis. STOMACH AND BOWEL: Unremarkable. APPENDIX: No findings to suggest appendicitis. ABDOMINOPELVIC CAVITY: No ascites. No pneumoperitoneum. No lymphadenopathy. VESSELS: Unremarkable for patient's age. PELVIS REPRODUCTIVE ORGANS: Unremarkable for  patient's age. URINARY BLADDER: Unremarkable. ABDOMINAL WALL/INGUINAL REGIONS: Unremarkable. BONES: No acute fracture or suspicious osseous lesion.     Impression: Workstation performed: [id number]     US bedside procedure    Result Date: 5/13/2024  Narrative: [id number]      Orders  No orders of the defined types were placed in this encounter.

## 2024-06-06 NOTE — PROGRESS NOTES
This is a 56-year-old female accompanied by her   Her English is limited  He has served as a   She is a  2 para 2 with 2 prior vaginal deliveries  Her current method of contraception includes the birth control pill  She is happy with this method would like to continue  She has noticed some increasing bloating and discomfort the week that is her non cycle week  We had a discussion about other methods of contraception including the intrauterine device  She was given a brochure about the Mirena  She they may consider  She denies any other major gynecological  GI complaint  There is no problem with intimacy  She denies any problem depression or anxiety  She sees a dentist on a regular basis  She is happy with her weight  There are no new major family illnesses report this time      Review of systems negative except for bloating      Medical history is negative, medication include birth control pills      Surgical history is negative      Family history is positive for diabetes, liver cancer hypertension      Social history patient is a former smoker  Positive for social alcohol        Physical exam this is a well-developed well-nourished female acute distress her HEENT is was within normal limits  Cardiac exam shows regular rhythm and rate normal S1-S2  Lungs clear to auscultation  Breast exam symmetrical nontender no retraction or dimpling axilla clear bilaterally  Abdomen is softer no masses positive bowel sounds  There is no rebound or guarding  Pelvic exam the external genitalia normal limits vagina is clean the uterus is midposition normal size is no cervical motion tenderness adnexa clear bilaterally  Impression stable gyn examination  She will be allowed to continue the birth control pill  They were given information about the Mirena intrauterine device  Eleven over decision  Return to office in 1 year  intact

## 2024-06-19 ENCOUNTER — ANESTHESIA (OUTPATIENT)
Dept: ANESTHESIOLOGY | Facility: HOSPITAL | Age: 44
End: 2024-06-19

## 2024-06-19 ENCOUNTER — ANESTHESIA EVENT (OUTPATIENT)
Dept: ANESTHESIOLOGY | Facility: HOSPITAL | Age: 44
End: 2024-06-19

## 2024-06-26 ENCOUNTER — TELEPHONE (OUTPATIENT)
Age: 44
End: 2024-06-26

## 2024-06-26 DIAGNOSIS — K59.00 CONSTIPATION, UNSPECIFIED CONSTIPATION TYPE: ICD-10-CM

## 2024-06-26 NOTE — TELEPHONE ENCOUNTER
"Pharmacy no longer have medication for colonoscopy     Patient called the RX Refill Line. Message is being forwarded to the office.   Patient state she is having colonoscopy on 07.03.2024 and is requesting medication for colonoscopy to be sent to the [facility] pharmacy [facility], she is not sure which medication, she state that the doctor would know but it could be polyethlene 4000mL, says she saw that medication on \"my chart\" but not sure if that's the one and the pharmacy does not have any colon prep for her.       Patient may be contacted at [phone]      "

## 2024-07-01 RX ORDER — SODIUM CHLORIDE 9 MG/ML
125 INJECTION, SOLUTION INTRAVENOUS CONTINUOUS
Status: CANCELLED | OUTPATIENT
Start: 2024-07-01

## 2024-07-03 ENCOUNTER — HOSPITAL ENCOUNTER (OUTPATIENT)
Dept: GASTROENTEROLOGY | Facility: MEDICAL CENTER | Age: 44
Setting detail: OUTPATIENT SURGERY
Discharge: HOME | End: 2024-07-03
Admitting: INTERNAL MEDICINE
Payer: MEDICARE

## 2024-07-03 ENCOUNTER — ANESTHESIA (OUTPATIENT)
Dept: GASTROENTEROLOGY | Facility: MEDICAL CENTER | Age: 44
End: 2024-07-03

## 2024-07-03 ENCOUNTER — ANESTHESIA EVENT (OUTPATIENT)
Dept: GASTROENTEROLOGY | Facility: MEDICAL CENTER | Age: 44
End: 2024-07-03

## 2024-07-03 VITALS
HEART RATE: 84 BPM | BODY MASS INDEX: 26.94 KG/M2 | DIASTOLIC BLOOD PRESSURE: 78 MMHG | OXYGEN SATURATION: 99 % | TEMPERATURE: 98.1 F | SYSTOLIC BLOOD PRESSURE: 110 MMHG | RESPIRATION RATE: 20 BRPM | WEIGHT: 172 LBS

## 2024-07-03 DIAGNOSIS — R10.13 EPIGASTRIC PAIN: ICD-10-CM

## 2024-07-03 DIAGNOSIS — K62.5 RECTAL BLEEDING: ICD-10-CM

## 2024-07-03 LAB
EXT PREGNANCY TEST URINE: NEGATIVE
EXT. CONTROL: NORMAL

## 2024-07-03 PROCEDURE — 45378 DIAGNOSTIC COLONOSCOPY: CPT | Performed by: INTERNAL MEDICINE

## 2024-07-03 PROCEDURE — 81025 URINE PREGNANCY TEST: CPT | Performed by: ANESTHESIOLOGY

## 2024-07-03 PROCEDURE — 43239 EGD BIOPSY SINGLE/MULTIPLE: CPT | Performed by: INTERNAL MEDICINE

## 2024-07-03 PROCEDURE — 88305 TISSUE EXAM BY PATHOLOGIST: CPT | Performed by: PATHOLOGY

## 2024-07-03 RX ORDER — SODIUM CHLORIDE 9 MG/ML
125 INJECTION, SOLUTION INTRAVENOUS CONTINUOUS
Status: DISCONTINUED | OUTPATIENT
Start: 2024-07-03 | End: 2024-07-07 | Stop reason: HOSPADM

## 2024-07-03 RX ORDER — LIDOCAINE HYDROCHLORIDE 20 MG/ML
INJECTION, SOLUTION EPIDURAL; INFILTRATION; INTRACAUDAL; PERINEURAL AS NEEDED
Status: DISCONTINUED | OUTPATIENT
Start: 2024-07-03 | End: 2024-07-03

## 2024-07-03 RX ORDER — PROPOFOL 10 MG/ML
INJECTION, EMULSION INTRAVENOUS AS NEEDED
Status: DISCONTINUED | OUTPATIENT
Start: 2024-07-03 | End: 2024-07-03

## 2024-07-03 RX ADMIN — PROPOFOL 100 MG: 10 INJECTION, EMULSION INTRAVENOUS at 10:56

## 2024-07-03 RX ADMIN — LIDOCAINE HYDROCHLORIDE 60 MG: 20 INJECTION, SOLUTION EPIDURAL; INFILTRATION; INTRACAUDAL at 10:56

## 2024-07-03 RX ADMIN — PROPOFOL 150 MCG/KG/MIN: 10 INJECTION, EMULSION INTRAVENOUS at 10:57

## 2024-07-03 RX ADMIN — SODIUM CHLORIDE: 0.9 INJECTION, SOLUTION INTRAVENOUS at 10:48

## 2024-07-03 RX ADMIN — PROPOFOL 30 MG: 10 INJECTION, EMULSION INTRAVENOUS at 10:58

## 2024-07-03 NOTE — INTERVAL H&P NOTE
H&P reviewed. After examining the patient I find no changes in the patients condition since the H&P had been written.    Vitals:    07/03/24 1016   BP: 132/85   Pulse: 89   Resp: 16   Temp: 98.1 °F (36.7 °C)   SpO2: 100%

## 2024-07-03 NOTE — ANESTHESIA POSTPROCEDURE EVALUATION
Post-Op Assessment Note    CV Status:  Stable  Pain Score: 0    Pain management: adequate       Mental Status:  Alert and awake   Hydration Status:  Euvolemic   PONV Controlled:  Controlled   Airway Patency:  Patent     Post Op Vitals Reviewed: Yes    No anethesia notable event occurred.    Staff: CRNA               BP 97/61 (07/03/24 1120)    Temp      Pulse 85 (07/03/24 1120)   Resp 18 (07/03/24 1120)    SpO2 100 % (07/03/24 1120)

## 2024-07-03 NOTE — ANESTHESIA PREPROCEDURE EVALUATION
Procedure:  EGD  COLONOSCOPY    Relevant Problems   No relevant active problems        Physical Exam    Airway    Mallampati score: II         Dental   No notable dental hx     Cardiovascular      Pulmonary      Other Findings  post-pubertal.      Anesthesia Plan  ASA Score- 1     Anesthesia Type- IV sedation with anesthesia with ASA Monitors.         Additional Monitors:     Airway Plan:     Comment: I have seen the patient and reviewed the history.  Patient to receive IV sedation with full ASA monitors.  Risks discussed with the patient, consent signed.  .       Plan Factors-Exercise tolerance (METS): >4 METS.    Chart reviewed.    Patient summary reviewed.                  Induction- intravenous.    Postoperative Plan-         Informed Consent- Anesthetic plan and risks discussed with patient.  I personally reviewed this patient with the CRNA. Discussed and agreed on the Anesthesia Plan with the CRNA..

## 2024-07-07 PROCEDURE — 88305 TISSUE EXAM BY PATHOLOGIST: CPT | Performed by: PATHOLOGY

## 2024-10-14 ENCOUNTER — HOSPITAL ENCOUNTER (OUTPATIENT)
Dept: MAMMOGRAPHY | Facility: MEDICAL CENTER | Age: 44
Discharge: HOME | End: 2024-10-14
Payer: MEDICARE

## 2024-10-14 VITALS — WEIGHT: 172 LBS | HEIGHT: 67 IN | BODY MASS INDEX: 27 KG/M2

## 2024-10-14 DIAGNOSIS — Z12.31 ENCOUNTER FOR SCREENING MAMMOGRAM FOR MALIGNANT NEOPLASM OF BREAST: ICD-10-CM

## 2024-10-14 PROCEDURE — 77063 BREAST TOMOSYNTHESIS BI: CPT

## 2024-10-14 PROCEDURE — 77067 SCR MAMMO BI INCL CAD: CPT

## 2024-10-15 ENCOUNTER — OFFICE VISIT (OUTPATIENT)
Dept: GASTROENTEROLOGY | Facility: MEDICAL CENTER | Age: 44
End: 2024-10-15
Payer: MEDICARE

## 2024-10-15 VITALS
BODY MASS INDEX: 27.47 KG/M2 | TEMPERATURE: 97.8 F | SYSTOLIC BLOOD PRESSURE: 110 MMHG | WEIGHT: 175 LBS | HEIGHT: 67 IN | OXYGEN SATURATION: 99 % | DIASTOLIC BLOOD PRESSURE: 78 MMHG | HEART RATE: 91 BPM

## 2024-10-15 DIAGNOSIS — K59.00 CONSTIPATION, UNSPECIFIED CONSTIPATION TYPE: ICD-10-CM

## 2024-10-15 DIAGNOSIS — K64.8 OTHER HEMORRHOIDS: Primary | ICD-10-CM

## 2024-10-15 DIAGNOSIS — R10.30 LOWER ABDOMINAL PAIN: ICD-10-CM

## 2024-10-15 PROCEDURE — 99214 OFFICE O/P EST MOD 30 MIN: CPT | Performed by: INTERNAL MEDICINE

## 2024-10-15 RX ORDER — BUTYROSPERMUM PARKII(SHEA BUTTER), SIMMONDSIA CHINENSIS (JOJOBA) SEED OIL, ALOE BARBADENSIS LEAF EXTRACT .01; 1; 3.5 G/100G; G/100G; G/100G
1 LIQUID TOPICAL DAILY
COMMUNITY

## 2024-10-15 RX ORDER — GREEN TEA/HOODIA GORDONII 315-12.5MG
1 CAPSULE ORAL DAILY
COMMUNITY
Start: 2024-10-12

## 2024-10-15 NOTE — PROGRESS NOTES
[facility] - Outpatient Follow-up Note  Yamila Decker 44 y.o. female MRN: [id number]  Encounter: [id number]          ASSESSMENT AND PLAN:  44-year-old female with no significant past medical history who presents for follow-up evaluation.    1. Other hemorrhoids  2. Constipation, unspecified constipation type  3. Lower abdominal pain  She reports ongoing hemorrhoidal irritation.  She recently underwent colonoscopy which was normal other than hemorrhoids.  She has used conservative treatment including over-the-counter cream, sitz bath's, fiber supplementation still with ongoing symptoms.  She is interested in a colorectal surgery evaluation to discuss hemorrhoidectomy or hemorrhoid banding  She has underlying IBS and constipation predominant symptoms as well.  Recommend continuing to adhere to the low FODMAP diet.  She previously noted lightheadedness with Bentyl use, thus we will trial Levsin as needed and she will continue to monitor for side effects with the medication.  She may use the medication up to every 4 hours as needed.  If she has ongoing constipation symptoms recommend starting MiraLAX and titrating as needed to have a soft, formed bowel movement per day.    - Ambulatory Referral to Colorectal Surgery; Future  - hyoscyamine (LEVSIN/SL) 0.125 mg SL tablet; Take 1 tablet (0.125 mg total) by mouth every 4 (four) hours as needed for cramping  Dispense: 120 tablet; Refill: 1    Follow-up in 6 months  ______________________________________________________________________    SUBJECTIVE: 44-year-old female with no significant past medical history who presents for follow-up evaluation.    She was last seen in the GI office in May 2024.  She was previously evaluated for constipation, generalized abdominal discomfort and epigastric pain.  Her abdominal imaging was previously unremarkable other than a CT scan showing a large stool burden throughout the colon.  She also reported hemorrhoidal  irritation for which she was prescribed Anusol suppositories and recommended supportive care for hemorrhoidal management.    Interval history: She underwent EGD 7/2024 showing erosion in the stomach.  Colonoscopy showed hemorrhoids otherwise normal exam and she was recommended repeat in 10 years.  Gastric biopsy showed gastritis and duodenal biopsy showed mild active duodenitis.    She reports having bowel movement usually once per day but can have a bowel movement every 2 to 3 days.  At times she had straining with bowel movements.  She has adhering to a IBS, low FODMAP diet which helps with her bowel regulation.  At times she can still have bright blood per rectum with wiping and hemorrhoidal irritation.  She is using a medication cream which she purchased on [employer] which can help at times.  She was using Bentyl which helped with her lower abdominal cramping but reported lightheadedness and drowsiness with the medication      REVIEW OF SYSTEMS IS OTHERWISE NEGATIVE.  10 point review of systems is negative other than stated as per    Historical Information   History reviewed. No pertinent past medical history.  History reviewed. No pertinent surgical history.  Social History   Social History     Substance and Sexual Activity   Alcohol Use No     Social History     Substance and Sexual Activity   Drug Use No     Social History     Tobacco Use   Smoking Status Never   Smokeless Tobacco Never     Family History   Problem Relation Age of Onset    Diabetes Mother         Diabetes Mellitus    Hypertension Mother     Liver cancer Father     Pancreatic cancer Father     No Known Problems Sister     No Known Problems Sister     No Known Problems Maternal Grandmother     No Known Problems Maternal Grandfather     No Known Problems Paternal Grandmother     No Known Problems Paternal Grandfather     No Known Problems Brother     No Known Problems Son     No Known Problems Son     No Known Problems Maternal Aunt     No Known  "Problems Maternal Aunt     No Known Problems Maternal Aunt     No Known Problems Maternal Aunt     No Known Problems Maternal Aunt     No Known Problems Maternal Uncle     No Known Problems Maternal Uncle     No Known Problems Paternal Aunt     No Known Problems Paternal Uncle     No Known Problems Paternal Uncle     Breast cancer Neg Hx        Meds/Allergies       Current Outpatient Medications:     Cholecalciferol (D3 2000) 50 MCG (2000 UT) CAPS    Cyanocobalamin (B-12) 500 MCG SUBL    dicyclomine (BENTYL) 10 mg capsule    magnesium (MAGTAB) 84 MG (7MEQ) TBCR    omeprazole (PriLOSEC) 40 MG capsule    Allergies   Allergen Reactions    Doxycycline Swelling     ankles    Penicillins Rash           Objective     Blood pressure 110/78, pulse 91, temperature 97.8 °F (36.6 °C), temperature source Tympanic, height 5' 7\" (1.702 m), weight 79.4 kg (175 lb), last menstrual period 09/22/2024, SpO2 99%, not currently breastfeeding. Body mass index is 27.41 kg/m².      PHYSICAL EXAM:      General Appearance:   Alert, cooperative, no distress   HEENT:   Normocephalic, atraumatic, anicteric.     Neck:  Supple, symmetrical, trachea midline   Lungs:   Clear to auscultation bilaterally; no rales, rhonchi or wheezing; respirations unlabored    Heart::   Regular rate and rhythm; no murmur, rub, or gallop.   Abdomen:   Soft, mild lower abdominal and left-sided tenderness to deep palpation without rebound or guarding, non-distended; normal bowel sounds; no masses, no organomegaly    Genitalia:   Deferred    Rectal:   Deferred    Extremities:  No cyanosis, clubbing or edema    Pulses:  2+ and symmetric    Skin:  No jaundice, rashes, or lesions    Lymph nodes:  No palpable cervical lymphadenopathy        Lab Results:   No visits with results within 1 Day(s) from this visit.   Latest known visit with results is:   Hospital Outpatient Visit on 07/03/2024   Component Date Value    EXT Preg Test, Ur 07/03/2024 Negative     Control 07/03/2024 " Valid     Case Report 07/03/2024                      Value:Surgical Pathology Report                         Case: [id number]                                  Authorizing Provider:  Felicia Pimentel DO    Collected:           07/03/2024 1059              Ordering Location:     [facility]        Received:            07/03/2024 [address] [place] [address] Endoscopy                                                     Pathologist:           Armida Valentine MD                                                         Specimens:   A) - Duodenum, duodenal bx r/o celiac                                                               B) - Stomach, gastric bx r/o h pylori                                                      Final Diagnosis 07/03/2024                      Value:A. Duodenum (biopsy):  - Focal mild active duodenitis   - No Marsh lesion  - Negative for dysplasia     B. Stomach (biopsy):  - Chronic inactive antral gastritis   - No H pylori identified (H&E)  - No intestinal metaplasia       Additional Information 07/03/2024                      Value:All reported additional testing was performed with appropriately reactive controls.  These tests were developed and their performance characteristics determined by [facility] or appropriate performing facility, though some tests may be performed on tissues which have not been validated for performance characteristics (such as staining performed on alcohol exposed cell blocks and decalcified tissues).  Results should be interpreted with caution and in the context of the patients’ clinical condition. These tests may not be cleared or approved by the U.S. Food and Drug Administration, though the FDA has determined that such clearance or approval is not necessary. These tests are used for clinical purposes and they should not be regarded as investigational or for research. This laboratory has been approved by  "IA 88, designated as a high-complexity laboratory and is qualified to perform these tests.  .      Gross Description 2024                      Value:A. The specimen is received in formalin, labeled with the patient's name and hospital number, and is designated \" duodenal biopsy\".  The specimen consists of multiple tan soft tissue fragments measuring in loose aggregate 1.2 x 0.5 x 0.2 cm.  Due to the size and consistency of the specimen it is questionable whether it will survive processing.  Entirely submitted. One screened cassette.  B. The specimen is received in formalin, labeled with the patient's name and hospital number, and is designated \" gastric biopsy\".  The specimen consists of multiple tan soft tissue fragments measuring in loose aggregate 1.0 x 0.3 x 0.2 cm.  Entirely submitted. One screened cassette.    Note: The estimated total formalin fixation time based upon information provided by the submitting clinician and the standard processing schedule is under 72 hours. [facility]             Radiology Results:   Holter monitor    Result Date: 2024  Narrative:             [facility] [place]                                                                               Test Date:    2024 Pat Name:     IAIN MCBRIDE               Department:   Patient ID:   [id number]                 Room:         Gender:       Female                   Technician:   Soco King :          1980               Requested By: NUSRAT FUNES Order Number: [id number]                Krunal MD:   Bandar Hicks                            Interpretive Statements HOLTER MONITOR REPORT PATIENT NAME: IAIN MCBRIDE   MRN:  [id number] ENCOUNTER:  [id number] :  1980 REFERRING PHYSICIAN:  NUSRAT FUNES STUDY DATE:  2024 _____________________________________________________________________________ []The patient did not maintain a diary. [x]The patient maintained a " diary with symptoms of:                                [x]Dizziness                                [x]Shortness of breath                                []Chest pain                                [x]Palpitations                                []Lightheadedness                                []Left arm pain                                []Tiredness                                []Chest pressure The technician-generated rhythm strips were analyzed for interpretation.  The maximum heart rate was 146 BPM at 11:11:58 AM, mean heart rate 87 BPM, and minimum heart rate 56 BPM at 11:01:41 PM. VENTRICULAR ECTOPIC BEATS: PVCs:  0  (0.0%) Couplets:  0 Events Triplets:  0 Events Ventricular Runs:  0 runs total Longest Run: 0 Fastest Run: 0 BPM Pauses: Longest R-R interval:  1.2 sec at 3:29:07 AM Drop/Late beats:  0/0 SUPRAVENTRICULAR ECTOPIC BEATS: PACs:  0  (0.0%) Atrial Pairs:0 Events Atrial Run:  0 runs total Longest Run: 0 Fastest Run: 0 BPM Afib: 0  (0.0%) total beats with a duration of 0.0 min Events : 0 _____________________________________________________________________________ IMPRESSION:    Patient was in sinus rhythm throughout the monitoring period. No atrial fibrillation noted. No significant pauses. Electronically Signed On 9- 13:36:00 EDT by Bandar Hicks      This note was completed in part utilizing Dragon Software. Grammatical errors, random word insertions, spelling mistakes, and incomplete sentences may be an occasional consequence of this system secondary to software limitations, ambient noise, and hardware issues. If you have any questions or concerns about the content, text, or information contained within the body of this dictation, please contact the provider for clarification.

## 2024-11-11 NOTE — PROGRESS NOTES
Ambulatory Visit  Name: Yamila Decker      : 1980      MRN: [id number]  Encounter Provider: Alisson Stover MD  Encounter Date: 11/15/2024   Encounter department: [facility] [place]    Assessment & Plan  Other hemorrhoids  Patient reports symptoms of painless bleeding with bowel movements as well as irritation from external hemorrhoidal skin tags  We discussed potential use of sclerotherapy versus rubber band ligation for internal hemorrhoids  However, she would like external hemorrhoidal tissue addressed as well  I did recommend excisional hemorrhoidectomy if she desires treatment for both internal and external hemorrhoids  We discussed surgery itself as well as postoperative recovery  She would like time to think about and plan for surgery in the future  She will contact us when she is ready to schedule surgery  Orders:    Ambulatory Referral to Colorectal Surgery      History of Present Illness     Yamila Decker is a 44 y.o. female who is referred by Dr. Diana Jaiyeola for hemorrhoidal symptoms.     She reports a several month history of hemorrhoids. She notes that she had hemorrhoids in the past but they resolved. She states that they become inflamed/ aggravated.     She notes a history of constipation.   She normally has 1-2 bowel movements a day. She can go a day without having a bowel movement. She sometimes has to strain to produce a bowel movement.     She notes blood upon wiping in the past. The last time that this occurred was around a month ago. She notes improvement with diet change and using a bidet.     Last colonoscopy was performed on 2024 by Dr. Felicia Pimentel with a 10 year recall. External and internal hemorrhoids with hypertrophied anal papillae were noted. The procedure was otherwise unremarkable.     History obtained from : patient  Review of Systems   Constitutional:  Negative for chills and fever.   HENT:  Negative for ear pain and sore  throat.    Eyes:  Negative for pain and visual disturbance.   Respiratory:  Negative for cough and shortness of breath.    Cardiovascular:  Negative for chest pain and palpitations.   Gastrointestinal:  Positive for anal bleeding and rectal pain. Negative for abdominal pain and vomiting.   Genitourinary:  Negative for dysuria and hematuria.   Musculoskeletal:  Negative for arthralgias and back pain.   Skin:  Negative for color change and rash.   Neurological:  Negative for seizures and syncope.   All other systems reviewed and are negative.    Past Medical History   History reviewed. No pertinent past medical history.  History reviewed. No pertinent surgical history.  Family History   Problem Relation Age of Onset    Diabetes Mother         Diabetes Mellitus    Hypertension Mother     Liver cancer Father     Pancreatic cancer Father     No Known Problems Sister     No Known Problems Sister     No Known Problems Maternal Grandmother     No Known Problems Maternal Grandfather     No Known Problems Paternal Grandmother     No Known Problems Paternal Grandfather     No Known Problems Brother     No Known Problems Son     No Known Problems Son     No Known Problems Maternal Aunt     No Known Problems Maternal Aunt     No Known Problems Maternal Aunt     No Known Problems Maternal Aunt     No Known Problems Maternal Aunt     No Known Problems Maternal Uncle     No Known Problems Maternal Uncle     No Known Problems Paternal Aunt     No Known Problems Paternal Uncle     No Known Problems Paternal Uncle     Breast cancer Neg Hx       reports that she has never smoked. She has never used smokeless tobacco. She reports that she does not drink alcohol and does not use drugs.  Current Outpatient Medications on File Prior to Visit   Medication Sig Dispense Refill    Cholecalciferol (D3 2000) 50 MCG (2000 UT) CAPS Take 1 capsule by mouth daily      Cyanocobalamin (B-12) 500 MCG SUBL Take 1 tablet by mouth daily      hyoscyamine  "(LEVSIN/SL) 0.125 mg SL tablet Take 1 tablet (0.125 mg total) by mouth every 4 (four) hours as needed for cramping 120 tablet 1    magnesium (MAGTAB) 84 MG (7MEQ) TBCR Take 84 mg by mouth daily 250mg      omeprazole (PriLOSEC) 40 MG capsule Take 1 capsule (40 mg total) by mouth daily 30 capsule 3     No current facility-administered medications on file prior to visit.     Allergies   Allergen Reactions    Doxycycline Swelling     ankles    Penicillins Rash          Objective     Ht 5' 7\" (1.702 m)   Wt 78.9 kg (174 lb)   BMI 27.25 kg/m²     Physical Exam  Vitals and nursing note reviewed.   Constitutional:       General: She is not in acute distress.     Appearance: She is well-developed.   HENT:      Head: Normocephalic and atraumatic.   Eyes:      Conjunctiva/sclera: Conjunctivae normal.   Cardiovascular:      Rate and Rhythm: Normal rate and regular rhythm.      Heart sounds: No murmur heard.  Pulmonary:      Effort: Pulmonary effort is normal. No respiratory distress.      Breath sounds: Normal breath sounds.   Abdominal:      Palpations: Abdomen is soft.      Tenderness: There is no abdominal tenderness.   Genitourinary:     Comments: Moderate-sized anterior and posterior midline external hemorrhoids  Strong tone on digital anorectal exam  Anoscopy showing moderate nonbleeding internal hemorrhoids  Musculoskeletal:         General: No swelling.      Cervical back: Neck supple.   Skin:     General: Skin is warm and dry.      Capillary Refill: Capillary refill takes less than 2 seconds.   Neurological:      Mental Status: She is alert.   Psychiatric:         Mood and Affect: Mood normal.     Lower Endoscopy    Date/Time: 11/15/2024 10:00 AM    Performed by: Alisson Stover MD  Authorized by: Alisson Stover MD    Verbal consent obtained?: Yes    Risks and benefits: Risks, benefits and alternatives were discussed    Consent given by:  Patient  Patient identity confirmed:  Verbally with " patient and provided demographic data  Time out: Immediately prior to the procedure a time out was called    Patient sedated: No    Scope type:  Anoscope  External exam performed: Yes    Digital exam performed: Yes    Internal hemorrhoids: Yes        Administrative Statements   I have spent a total time of 34 minutes in caring for this patient on the day of the visit/encounter including Diagnostic results, Prognosis, Risks and benefits of tx options, Instructions for management, Patient and family education, Importance of tx compliance, Risk factor reductions, Impressions, Counseling / Coordination of care, Documenting in the medical record, Reviewing / ordering tests, medicine, procedures  , Obtaining or reviewing history  , and Communicating with other healthcare professionals .

## 2024-11-14 ENCOUNTER — HOSPITAL ENCOUNTER (OUTPATIENT)
Dept: ULTRASOUND IMAGING | Facility: CLINIC | Age: 44
Discharge: HOME | End: 2024-11-14
Payer: MEDICARE

## 2024-11-14 DIAGNOSIS — R92.8 ABNORMAL SCREENING MAMMOGRAM: ICD-10-CM

## 2024-11-14 PROCEDURE — 76642 ULTRASOUND BREAST LIMITED: CPT

## 2024-11-15 ENCOUNTER — APPOINTMENT (OUTPATIENT)
Dept: LAB | Facility: CLINIC | Age: 44
End: 2024-11-15

## 2024-11-15 ENCOUNTER — OFFICE VISIT (OUTPATIENT)
Age: 44
End: 2024-11-15
Payer: MEDICARE

## 2024-11-15 ENCOUNTER — CONSULT (OUTPATIENT)
Dept: DERMATOLOGY | Facility: CLINIC | Age: 44
End: 2024-11-15
Payer: MEDICARE

## 2024-11-15 VITALS
HEIGHT: 67 IN | SYSTOLIC BLOOD PRESSURE: 132 MMHG | HEART RATE: 103 BPM | OXYGEN SATURATION: 100 % | DIASTOLIC BLOOD PRESSURE: 94 MMHG | BODY MASS INDEX: 27.15 KG/M2 | WEIGHT: 173 LBS | TEMPERATURE: 99 F

## 2024-11-15 VITALS — WEIGHT: 174 LBS | HEIGHT: 67 IN | BODY MASS INDEX: 27.31 KG/M2

## 2024-11-15 DIAGNOSIS — R23.2 FACIAL FLUSHING: ICD-10-CM

## 2024-11-15 DIAGNOSIS — L71.9 ROSACEA: Primary | ICD-10-CM

## 2024-11-15 DIAGNOSIS — K64.8 OTHER HEMORRHOIDS: Primary | ICD-10-CM

## 2024-11-15 PROCEDURE — 99203 OFFICE O/P NEW LOW 30 MIN: CPT | Performed by: SURGERY

## 2024-11-15 PROCEDURE — 46600 DIAGNOSTIC ANOSCOPY SPX: CPT | Performed by: SURGERY

## 2024-11-15 PROCEDURE — 99244 OFF/OP CNSLTJ NEW/EST MOD 40: CPT | Performed by: NURSE PRACTITIONER

## 2024-11-15 RX ORDER — ERYTHROMYCIN 5 MG/G
OINTMENT OPHTHALMIC
COMMUNITY
Start: 2024-09-16

## 2024-11-15 RX ORDER — MINOCYCLINE HYDROCHLORIDE 50 MG/1
50 TABLET ORAL DAILY
Qty: 30 TABLET | Refills: 0 | Status: SHIPPED | OUTPATIENT
Start: 2024-11-15 | End: 2024-12-15

## 2024-11-15 NOTE — PATIENT INSTRUCTIONS
Post-Operative Care Information   Hemorrhoidectomy, EUA, Fissurectomy, Fistulotomy, Sphincterotomy      Resume high fiber diet. Fiber supplementation with Metamucil or Konsyl also recommended daily.       Your first bowel movement may take up to 3 days after surgery. THIS IS OFTEN PAINFUL.      While taking narcotic pain medication, you should remain on an over-the-counter stool softener such as Colace (one tablet twice daily). For constipation Miralax can be taken up to three times daily.     Pain is to be expected after hemorrhoid surgery. Ibuprofen (400? 600MG) every 6?8 hours is an excellent alternative in addition or as a substitute to your narcotic pain medication.     Rectal bleeding or drainage is normal after surgery.       Nausea is a common complaint post op. This can be associated with the narcotic pain medications, anesthesia as well as with severe constipation.     Driving may be resumed when all off all narcotic pain medications and you can turn or twist your body without hesitation.    If you are unable to urinate within 8 hours after surgery, please call the office at [phone]    Frequent warm tub soaks or warm showers are often soothing, we suggest 3 to 4 times daily.    After bowel movements, you may wash with a hand shower or warm tub soak.  No soap is okay.     No strenuous activity (i.e., Running, jogging, swimming, or weightlifting) for up to one week after surgery.     When will I receive follow-up care?      You should be scheduled for a post-op appointment within 3-4 weeks after surgery, unless otherwise instructed on your discharge summary. If you do not have an existing appointment at the time of discharge, please call our office at [phone].    Call the office at [phone] immediately if you have a fever, chills, excessive sweating, difficulty urinating, or excessive/profuse bleeding with clots.

## 2024-11-15 NOTE — PROGRESS NOTES
"[facility] Dermatology Clinic Note     Patient Name: Yamila Decker  Encounter Date: 11/15/24     Have you been cared for by a [facility] Dermatologist in the last 3 years and, if so, which description applies to you?    NO.   I am considered a \"new\" patient and must complete all patient intake questions. I am FEMALE/of child-bearing potential.    REVIEW OF SYSTEMS:  Have you recently had or currently have any of the following? Recent fever or chills? No  Any non-healing wound? No  Are you pregnant or planning to become pregnant? No  Are you currently or planning to be nursing or breast feeding? No   PAST MEDICAL HISTORY:  Have you personally ever had or currently have any of the following?  If \"YES,\" then please provide more detail. Skin cancer (such as Melanoma, Basal Cell Carcinoma, Squamous Cell Carcinoma?  No  Tuberculosis, HIV/AIDS, Hepatitis B or C: No  Radiation Treatment No   HISTORY OF IMMUNOSUPPRESSION:   Do you have a history of any of the following:  Systemic Immunosuppression such as Diabetes, Biologic or Immunotherapy, Chemotherapy, Organ Transplantation, Bone Marrow Transplantation or Prednsione?  No    Answering \"YES\" requires the addition of the dotphrase \"IMMUNOSUPPRESSED\" as the first diagnosis of the patient's visit.   FAMILY HISTORY:  Any \"first degree relatives\" (parent, brother, sister, or child) with the following?    Skin Cancer, Pancreatic or Other Cancer? YES, father-liver   PATIENT EXPERIENCE:    Do you want the Dermatologist to perform a COMPLETE skin exam today including a clinical examination under the \"bra and underwear\" areas?  NO  If necessary, do we have your permission to call and leave a detailed message on your Preferred Phone number that includes your specific medical information?  Yes      Allergies   Allergen Reactions    Doxycycline Swelling     ankles    Penicillins Rash      Current Outpatient Medications:     Cholecalciferol (D3 2000) 50 MCG (2000 UT) CAPS, Take 1 capsule by " "mouth daily, Disp: , Rfl:     Cyanocobalamin (B-12) 500 MCG SUBL, Take 1 tablet by mouth daily, Disp: , Rfl:     hyoscyamine (LEVSIN/SL) 0.125 mg SL tablet, Take 1 tablet (0.125 mg total) by mouth every 4 (four) hours as needed for cramping, Disp: 120 tablet, Rfl: 1    magnesium (MAGTAB) 84 MG (7MEQ) TBCR, Take 84 mg by mouth daily 250mg, Disp: , Rfl:     omeprazole (PriLOSEC) 40 MG capsule, Take 1 capsule (40 mg total) by mouth daily, Disp: 30 capsule, Rfl: 3        New patient present for rosacea present since 2009.  Whom besides the patient is providing clinical information about today's encounter?   NO ADDITIONAL HISTORIAN (patient alone provided history)    Physical Exam and Assessment/Plan by Diagnosis:    ROSACEA & FLUSHING     Physical Exam:  Anatomic Location Affected:  Face  Morphological Description:  erythematous patches with few scattered papules/pustules; sparing periorbits     Patient is sitting comfortably. Slightly pale skin; oral mucosa pale; +2 radial pulses. Dry appearing, cracked lips. <2 second capillary refill. No abdominal guarding. No obvious lower leg edema.     Visit Vitals  /94 (BP Location: Left arm, Patient Position: Sitting, Cuff Size: Standard)   Pulse 103   Temp 99 °F (37.2 °C) (Temporal)   Ht 5' 7\" (1.702 m)   Wt 78.5 kg (173 lb)   SpO2 100%   BMI 27.10 kg/m²   OB Status Unknown   Smoking Status Never   BSA 1.9 m²        Additional History of Present Condition:  patient presents for intermittent moderate to severe red face/flushing x 2 years. She reports hx of rosacea since about 2009. Triggers include hot and cold weather, stress, pre-menstrual period, and certain foods/ cleaning product scents. Pt has taken doxycycline in the past but developed an allergy, including symptoms of skin rash & ankle swelling. She has not had any treatment for the past few years otherwise. Patient also has hx of GI conditions, such as IBS, hemorrhoids, epigastric pain, GERD, constipation and " gastric ulcer; currently followed by GI. She feels slightly fatigued, de-hydrated despite drinking adequate water, and has been constipated as of recently. She denies vomiting, diarrhea or bloody bowel movements. She denies sob, palpitations, dizziness or chest pain at this time. She has follow up appt with GI next week.     Assessment and Plan:  Based on a thorough discussion of this condition and the management approach to it (including a comprehensive discussion of the known risks, side effects and potential benefits of treatment), the patient (family) agrees to implement the following specific plan:  Condition likely c/w rosacea, although due to associated GI symptoms and flushing, will order 5-HIAA urine   5- HIAA lab test ordered: This is a patient instruction: Obtain collection container from provider's office or a Laboratory. Patient needs to void at 8 AM and discard the specimen. Collect all urine for 24 hours including the final specimen voided at the end of the 24-hour collection period (ie, 8 AM the next morning).Patient to avoid bananas, avocados, plantain, pineapples, plums, eggplant, tomatoes, walnuts, salicylates, acetaminophen, phenacetin, cough syrup containing glyceryl gualacolate, mephenesin, methocarbamol, imipramine, isoniazid, mao inhibitors, methenamine, methyldopa and phenothiazide for 72 hours (or longer) prior to test and during test collection.  Recent labs are suggesting negative lupus   Start applying Triple cream twice daily   Start taking Minocycline 50 mg once daily for 30 days, if able to tolerate   STRICT ER PRECAUTIONS discussed. Patient should proceed to nearest ER if she develops any of the following symptoms: weakness, significant fatigue, chest pain, palpitations, shortness of breath, severe abdominal pain, dizziness, persistent vomiting, and/or black or bloody stools. Patient verbalizes understanding.   Otherwise, proceed to f/u appt with GI next week  Follow up with  dermatology in 1 month    Scribe Attestation      I,:  Cecilia Benson am acting as a scribe while in the presence of the attending physician.:       I,:  JOSE Kumar personally performed the services described in this documentation    as scribed in my presence.:

## 2024-11-15 NOTE — PATIENT INSTRUCTIONS
ROSACEA      Assessment and Plan:  Based on a thorough discussion of this condition and the management approach to it (including a comprehensive discussion of the known risks, side effects and potential benefits of treatment), the patient (family) agrees to implement the following specific plan:  Start applying Triple cream twice daily.  Start taking Minocycline 50 mg once daily for 30 days.   5- HIAA lab test ordered: This is a patient instruction: Obtain collection container from provider's office or a Laboratory. Patient needs to void at 8 AM and discard the specimen. Collect all urine for 24 hours including the final specimen voided at the end of the 24-hour collection period (ie, 8 AM the next morning).Patient to avoid bananas, avocados, plantain, pineapples, plums, eggplant, tomatoes, walnuts, salicylates, acetaminophen, phenacetin, cough syrup containing glyceryl gualacolate, mephenesin, methocarbamol, imipramine, isoniazid, mao inhibitors, methenamine, methyldopa and phenothiazide for 72 hours (or longer) prior to test and during test collection.  Follow up in one month      Rosacea is a chronic rash affecting the mid-face including the nose, cheeks, chin, forehead, and eyelids. The incidence is usually greatest between the ages of 30-60 years and is more common in people with fair skin. Common characteristics include redness, telangiectasias, papules and pustules over affected areas. Rosacea may look similar to acne, but there is a lack of comedones. Occasionally the eyes may also be involved in ocular rosacea. In advanced disease, enlargement of the sebaceous glands in the nose, termed rhinophyma, may be present.     Rosacea results in red spots (papules) and sometimes pustules over the face, but unlike acne there are no blackheads, whiteheads, or cystic nodules. Patients often experience increased facial flushing with prominent blood vessels (erythematotelangiectatic rosacea) and dry, sensitive skin.  These symptoms are exacerbated by sun exposure, hot or spicy foods, topical steroids and oil-based facial products.     In ocular rosacea, eyelids may be red and sore due to conjunctivitis, keratitis, and episcleritis. If rhinophyma develops due to enlargement of sebaceous glands, the patient may have an enlarged and irregularly shaped nose with prominent pores. In rosacea that is refractory to treatment, patients can develop persistent redness and swelling of the face due to lymphatic obstruction (Morbihan disease).     Distribution around the cheeks may be confused with the malar or “butterfly rash” of lupus. However, the rash of lupus spares the nasal creases and lacks papules and pustules. If signs of photosensitivity, oral ulcers, arthritis, and kidney dysfunction are present then consider referral to a rheumatologist.     There are many potential causes of rosacea including genetic, environmental, vascular, and inflammatory factors. These include, but are not limited to:  Chronic exposure to ultraviolet radiation   Increased immune responses in the form of cathelicidins that promote vessel dilation and infiltration with white blood cells (neutrophils) into the dermis  Increased matrix metalloproteinases such as collagen and elastase that remodel normal tissue may contribute to inflammation of the skin making it thicker and harder  There is some evidence to suggest that increased numbers of demodex mites on patient skin may contribute to rosacea papules     General Treatment Approach   Avoid exacerbating factors such as heat, spicy foods, and alcohol   Use daily SPF30+ sunscreen and other methods of coverage for sun protection  Use water-based make-up   Avoid applying topical steroids to affected areas as they can cause perioral dermatitis and exacerbate rosacea     Topical Treatment Approach  Metronidazole cream or gel by itself or in combination with oral antibiotics for more severe cases  Azelaic acid cream  or lotion is effective for mild inflammatory rosacea when applied twice daily to affected areas  Brimonidine gel and oxymetazoline hydrochloride cream can reduce facial redness temporarily   Ivermectin cream can treat papulopustular rosacea by controlling demodex mites and inflammation   Pimecrolimus cream or tacrolimus ointment twice a day for 2-3 months can help reduce inflammation    Oral Treatment Approach  Antibiotics such as doxycycline, minocycline, or erythromycin for 1-3 months  Clonidine and carvedilol can help reduce facial flushing and are generally well tolerated. Common side effects include low blood pressure, gastrointestinal upset, dry eyes, blurred vision and low heart rate.   Isotretinoin at low doses can be effective for long term treatment when antibiotics fail. Side effects may make it unsuitable for some patients.   NSAIDs such as diclofenac can help reduce discomfort and redness in the skin.     Procedural/Surgical Treatment Approach   Vascular lasers or intense pulsed light treatment may be used to treat persistent telangiectasia and papulopustular rosacea  Plastic surgery and carbon dioxide lasers may be used to treat rhinophyma

## 2024-11-21 DIAGNOSIS — R10.13 EPIGASTRIC PAIN: ICD-10-CM

## 2024-11-21 RX ORDER — OMEPRAZOLE 40 MG/1
40 CAPSULE, DELAYED RELEASE ORAL DAILY
Qty: 30 CAPSULE | Refills: 5 | Status: SHIPPED | OUTPATIENT
Start: 2024-11-21

## 2024-11-22 ENCOUNTER — APPOINTMENT (OUTPATIENT)
Dept: LAB | Facility: CLINIC | Age: 44
End: 2024-11-22
Payer: MEDICARE

## 2024-11-22 DIAGNOSIS — R23.2 FACIAL FLUSHING: ICD-10-CM

## 2024-11-22 PROCEDURE — 83497 ASSAY OF 5-HIAA: CPT

## 2024-12-01 LAB
5OH-INDOLEACETATE 24H UR-MCNC: 1.5 MG/L
5OH-INDOLEACETATE 24H UR-MRATE: 4.9 MG/24 HR (ref 0–14.9)

## 2024-12-03 ENCOUNTER — RESULTS FOLLOW-UP (OUTPATIENT)
Dept: DERMATOLOGY | Facility: CLINIC | Age: 44
End: 2024-12-03

## 2024-12-10 ENCOUNTER — ANNUAL EXAM (OUTPATIENT)
Dept: OBGYN CLINIC | Facility: CLINIC | Age: 44
End: 2024-12-10
Payer: MEDICARE

## 2024-12-10 VITALS — WEIGHT: 178 LBS | DIASTOLIC BLOOD PRESSURE: 84 MMHG | BODY MASS INDEX: 27.88 KG/M2 | SYSTOLIC BLOOD PRESSURE: 128 MMHG

## 2024-12-10 DIAGNOSIS — N89.8 VAGINAL DISCHARGE: Primary | ICD-10-CM

## 2024-12-10 DIAGNOSIS — Z01.419 WOMEN'S ANNUAL ROUTINE GYNECOLOGICAL EXAMINATION: ICD-10-CM

## 2024-12-10 DIAGNOSIS — Z12.31 ENCOUNTER FOR SCREENING MAMMOGRAM FOR BREAST CANCER: ICD-10-CM

## 2024-12-10 PROCEDURE — 99396 PREV VISIT EST AGE 40-64: CPT | Performed by: OBSTETRICS & GYNECOLOGY

## 2024-12-10 NOTE — PROGRESS NOTES
Name: Yamila Decker      : 1980      MRN: [id number]  Encounter Provider: Jovanny Macias MD  Encounter Date: 12/10/2024   Encounter department: [facility]  :  Assessment & Plan  Women's annual routine gynecological examination  The patient was informed of a stable GYN examination.  There is no obvious discharge.  We did do a vaginal culture.  If it is positive she will be informed.  She should return to my office in 1 year.  Now her about the possibility of pregnancy not using any formal contraception.  She will continue getting yearly mammograms.  She should return to my office in 1 year unless new issues or problems occur.  She will be informed results of her molecular panel.       Encounter for screening mammogram for breast cancer    Orders:    Mammo screening bilateral w 3d and cad; Future        History of Present Illness   HPI  Yamila Decker is a 44 y.o. female who presents for annual GYN examination.  She was last here in a year .  Her menstrual cycles are regular and predictable.  There is no problem with intimacy.  She is a  2 para 2 with 2 prior vaginal deliveries.  Her youngest child has autism is 11 years old.  She is not worried with the possibility of pregnancy.  There is no problem with intimacy.  She denies any major  or GI complaint.  She is getting regular mammograms.  She is not happy with her weight.  Denies any prior depression.  Her PHQ-9 score is a total of 11 there is no problems with little interest or pleasure which she gave a score of 3.  She also has poor appetite and overeating she is not happy with her weight.  She is feeling tired or having little energy.  And she is think she is moving a slow or speaking at a slower than usual.  There are no new major family illnesses or deaths to report.  She will need a Pap smear today.  She was also complaining of some vaginal discharge.  Discharge is now diminished.      Review of Systems   All other systems reviewed  and are negative.         Objective   /84   Wt 80.7 kg (178 lb)   LMP 11/30/2024 (Exact Date)   BMI 27.88 kg/m²      Physical Exam  Vitals reviewed. Exam conducted with a chaperone present.   Constitutional:       Appearance: Normal appearance.   HENT:      Head: Normocephalic and atraumatic.      Nose: Nose normal.      Mouth/Throat:      Mouth: Mucous membranes are moist.   Eyes:      Extraocular Movements: Extraocular movements intact.      Pupils: Pupils are equal, round, and reactive to light.   Cardiovascular:      Rate and Rhythm: Normal rate and regular rhythm.      Pulses: Normal pulses.      Heart sounds: Normal heart sounds.   Pulmonary:      Effort: Pulmonary effort is normal.   Chest:   Breasts:     Breasts are symmetrical.      Right: Normal. No swelling, bleeding, inverted nipple, mass or nipple discharge.      Left: Normal. No swelling, bleeding, inverted nipple, mass or nipple discharge.   Abdominal:      General: Abdomen is flat. Bowel sounds are normal.      Palpations: Abdomen is soft. There is no hepatomegaly or splenomegaly.      Tenderness: There is no abdominal tenderness.      Hernia: No hernia is present. There is no hernia in the left inguinal area or right inguinal area.   Genitourinary:     General: Normal vulva.      Pubic Area: No rash or pubic lice.       Labia:         Right: No rash, tenderness, lesion or injury.         Left: No rash, tenderness, lesion or injury.       Urethra: No prolapse, urethral pain, urethral swelling or urethral lesion.      Vagina: Normal. No signs of injury and foreign body. No vaginal discharge, erythema, tenderness, bleeding, lesions or prolapsed vaginal walls.      Cervix: Normal.      Uterus: Normal. Not deviated, not enlarged, not fixed and not tender.       Adnexa: Right adnexa normal and left adnexa normal.        Right: No mass, tenderness or fullness.          Left: No mass, tenderness or fullness.        Rectum: Normal.      Comments:  The external genitalia normal limits the vagina is clean there is no obvious discharge.  We did do a molecular panel as per patient request.  Uterus is anterior normal size there is no cervical motion tenderness.  There is no evidence of prolapse.  Urethra and bladder normal working relationship.  A Pap smear was not performed.  Musculoskeletal:         General: Normal range of motion.      Cervical back: Normal range of motion and neck supple.   Lymphadenopathy:      Lower Body: No right inguinal adenopathy. No left inguinal adenopathy.   Skin:     General: Skin is warm and dry.   Neurological:      General: No focal deficit present.      Mental Status: She is alert and oriented to person, place, and time.   Psychiatric:         Mood and Affect: Mood normal.         Behavior: Behavior normal.         Thought Content: Thought content normal.

## 2024-12-10 NOTE — PATIENT INSTRUCTIONS
The patient was informed of negative findings for the day she early had a discharge with gonococcus clearing.  Molecular panel was performed.  If the panel was positive she will be informed and treated appropriately.  She will keep us informed.  She should return to my office in 1 year unless new issues or problems occur.

## 2024-12-11 LAB
BV BACTERIA RRNA VAG QL NAA+PROBE: NEGATIVE
C GLABRATA RNA VAG QL NAA+PROBE: NOT DETECTED
CANDIDA RRNA VAG QL PROBE: DETECTED
T VAGINALIS RRNA SPEC QL NAA+PROBE: NOT DETECTED

## 2024-12-12 ENCOUNTER — RESULTS FOLLOW-UP (OUTPATIENT)
Dept: OBGYN CLINIC | Facility: CLINIC | Age: 44
End: 2024-12-12

## 2024-12-12 DIAGNOSIS — B37.31 VAGINAL YEAST INFECTION: Primary | ICD-10-CM

## 2024-12-12 RX ORDER — FLUCONAZOLE 200 MG/1
TABLET ORAL
Qty: 2 TABLET | Refills: 1 | Status: SHIPPED | OUTPATIENT
Start: 2024-12-12 | End: 2024-12-13

## 2024-12-18 ENCOUNTER — OFFICE VISIT (OUTPATIENT)
Dept: DERMATOLOGY | Facility: CLINIC | Age: 44
End: 2024-12-18
Payer: MEDICARE

## 2024-12-18 VITALS — BODY MASS INDEX: 27.69 KG/M2 | TEMPERATURE: 97.4 F | WEIGHT: 176.4 LBS | HEIGHT: 67 IN

## 2024-12-18 DIAGNOSIS — L71.9 ROSACEA: Primary | ICD-10-CM

## 2024-12-18 DIAGNOSIS — M25.50 ARTHRALGIA, UNSPECIFIED JOINT: ICD-10-CM

## 2024-12-18 PROCEDURE — 99213 OFFICE O/P EST LOW 20 MIN: CPT | Performed by: NURSE PRACTITIONER

## 2024-12-18 NOTE — PROGRESS NOTES
"[facility] Dermatology Clinic Note     Patient Name: Yamila Decker  Encounter Date: 12/18/2024     Have you been cared for by a [facility] Dermatologist in the last 3 years and, if so, which description applies to you?    Yes.  I have been here within the last 3 years, and my medical history has NOT changed since that time.  I am FEMALE/of child-bearing potential.    REVIEW OF SYSTEMS:  Have you recently had or currently have any of the following? No changes in my recent health.   PAST MEDICAL HISTORY:  Have you personally ever had or currently have any of the following?  If \"YES,\" then please provide more detail. No changes in my medical history.   HISTORY OF IMMUNOSUPPRESSION: Do you have a history of any of the following:  Systemic Immunosuppression such as Diabetes, Biologic or Immunotherapy, Chemotherapy, Organ Transplantation, Bone Marrow Transplantation or Prednisone?  No     Answering \"YES\" requires the addition of the dotphrase \"IMMUNOSUPPRESSED\" as the first diagnosis of the patient's visit.   FAMILY HISTORY:  Any \"first degree relatives\" (parent, brother, sister, or child) with the following?    No changes in my family's known health.   PATIENT EXPERIENCE:    Do you want the Dermatologist to perform a COMPLETE skin exam today including a clinical examination under the \"bra and underwear\" areas?  NO  If necessary, do we have your permission to call and leave a detailed message on your Preferred Phone number that includes your specific medical information?  Yes      Allergies   Allergen Reactions    Doxycycline Swelling     ankles    Penicillins Rash      Current Outpatient Medications:     Cholecalciferol (D3 2000) 50 MCG (2000 UT) CAPS, Take 1 capsule by mouth daily, Disp: , Rfl:     Cyanocobalamin (B-12) 500 MCG SUBL, Take 1 tablet by mouth daily, Disp: , Rfl:     metroNIDAZOLE (METROCREAM) 0.75 % cream, Apply topically 2 (two) times a day Apply topically twice daily to the face., Disp: 45 g, Rfl: 3    " erythromycin (ILOTYCIN) ophthalmic ointment, APPLY 0.25 INCH IN BOTH EYES EVERY EVENING, Disp: , Rfl:     hyoscyamine (LEVSIN/SL) 0.125 mg SL tablet, Take 1 tablet (0.125 mg total) by mouth every 4 (four) hours as needed for cramping, Disp: 120 tablet, Rfl: 1    magnesium (MAGTAB) 84 MG (7MEQ) TBCR, Take 84 mg by mouth daily 250mg, Disp: , Rfl:     omeprazole (PriLOSEC) 40 MG capsule, TAKE 1 CAPSULE BY MOUTH EVERY DAY, Disp: 30 capsule, Rfl: 5    Propylene Glycol (SYSTANE COMPLETE PF OP), Apply to eye, Disp: , Rfl:           Whom besides the patient is providing clinical information about today's encounter?   NO ADDITIONAL HISTORIAN (patient alone provided history)    Physical Exam and Assessment/Plan by Diagnosis:    ROSACEA & FLUSHING f/u      Physical Exam:  Anatomic Location Affected:  Face  Morphological Description:  background redness and telangiectasias present, sparing periorbits; no papules or pustules         Additional History of Present Condition:  I last saw patient on 11/15/24 for facial redness and flushing. She reports improvement in the bumps, but redness and sometimes flushing remains. She was taking minocycline, but reports noticing some swelling in wrists, so she stopped taking. She has a prior contraindication/ allergy to doxycyline in the past. She is using the rosacea triple cream as directed.      Assessment and Plan:  Based on a thorough discussion of this condition and the management approach to it (including a comprehensive discussion of the known risks, side effects and potential benefits of treatment), the patient (family) agrees to implement the following specific plan:  5-HIAA urine was negative   Patient has changed diet to avoid gluten and dairy   Patient encouraged to keep a journal of when symptoms occur so she can attempt to evaluate triggers   Patient is adamant that there must be an inflammatory factor involved that are making her skin flush and feel overall dryness and  swelling; she reports sometimes having joint pain, but frequency, intensity and location is unclear. She has had an appropriate work up via her PCP and Gastro within the year   Referral to Rheum placed for additional evaluation   [facility] info given to patient for facial redness consult   F/u with derm as needed         Scribe Attestation      I,:  Sidra Trinidad MA am acting as a scribe while in the presence of the attending physician.:       I,:  JOSE Kumar personally performed the services described in this documentation    as scribed in my presence.:

## 2024-12-23 DIAGNOSIS — N76.0 BV (BACTERIAL VAGINOSIS): Primary | ICD-10-CM

## 2024-12-23 DIAGNOSIS — B96.89 BV (BACTERIAL VAGINOSIS): Primary | ICD-10-CM

## 2024-12-23 RX ORDER — METRONIDAZOLE 500 MG/1
TABLET ORAL
Qty: 14 TABLET | Refills: 0 | Status: SHIPPED | OUTPATIENT
Start: 2024-12-23 | End: 2024-12-29

## 2025-01-13 ENCOUNTER — OFFICE VISIT (OUTPATIENT)
Dept: OBGYN CLINIC | Facility: CLINIC | Age: 45
End: 2025-01-13
Payer: MEDICARE

## 2025-01-13 VITALS — WEIGHT: 175 LBS | DIASTOLIC BLOOD PRESSURE: 70 MMHG | BODY MASS INDEX: 27.41 KG/M2 | SYSTOLIC BLOOD PRESSURE: 118 MMHG

## 2025-01-13 DIAGNOSIS — B37.31 VAGINAL YEAST INFECTION: Primary | ICD-10-CM

## 2025-01-13 PROCEDURE — 99213 OFFICE O/P EST LOW 20 MIN: CPT | Performed by: OBSTETRICS & GYNECOLOGY

## 2025-01-13 RX ORDER — FLUCONAZOLE 200 MG/1
TABLET ORAL
Qty: 2 TABLET | Refills: 4 | Status: SHIPPED | OUTPATIENT
Start: 2025-01-13 | End: 2025-01-14

## 2025-01-13 RX ORDER — RIBOFLAVIN (VITAMIN B2) 100 MG
100 TABLET ORAL DAILY
COMMUNITY

## 2025-01-13 NOTE — PATIENT INSTRUCTIONS
The patient is complaining about return of the yeast symptoms whenever she takes antibiotics.  She was recently on Keflex.  Will give her prescription for Diflucan for 2 consecutive days she was given refills.  She will keep me informed.  She also should start using Estroven for perimenopausal symptoms.  She will keep informed of her progress.

## 2025-01-13 NOTE — PROGRESS NOTES
This is a 44-year-old white female  2 para 2 now returns today for recurrent history of yeast infections.  She is always on antibiotics for some reason all and that she this is followed by yeast infection.  She currently has her menstrual cycle.  She declined pelvic exam today.  She describes a white cheesy discharge with itching on the outside.  Because she has had a good response with Diflucan in the past we will repeat Diflucan today.  She will keep me informed.  She does not see improvement she will return to my office on a as needed basis.  She was also given refills of Diflucan whenever she takes antibiotics.  She is also complaining of some hot flashes and night sweats she may be early perimenopausal.  I told her like her to try the over-the-counter Estroven to keep me informed of her progress.

## 2025-02-25 ENCOUNTER — OFFICE VISIT (OUTPATIENT)
Dept: OBGYN CLINIC | Facility: CLINIC | Age: 45
End: 2025-02-25
Payer: MEDICARE

## 2025-02-25 ENCOUNTER — APPOINTMENT (OUTPATIENT)
Dept: LAB | Facility: CLINIC | Age: 45
End: 2025-02-25
Payer: MEDICARE

## 2025-02-25 VITALS — SYSTOLIC BLOOD PRESSURE: 118 MMHG | WEIGHT: 175 LBS | DIASTOLIC BLOOD PRESSURE: 68 MMHG | BODY MASS INDEX: 27.41 KG/M2

## 2025-02-25 DIAGNOSIS — N39.0 URINARY TRACT INFECTION WITHOUT HEMATURIA, SITE UNSPECIFIED: ICD-10-CM

## 2025-02-25 DIAGNOSIS — N39.0 URINARY TRACT INFECTION WITHOUT HEMATURIA, SITE UNSPECIFIED: Primary | ICD-10-CM

## 2025-02-25 DIAGNOSIS — N89.8 VAGINAL DISCHARGE: ICD-10-CM

## 2025-02-25 LAB
BACTERIA UR QL AUTO: ABNORMAL /HPF
BILIRUB UR QL STRIP: NEGATIVE
CLARITY UR: CLEAR
COLOR UR: ABNORMAL
GLUCOSE UR STRIP-MCNC: NEGATIVE MG/DL
HGB UR QL STRIP.AUTO: ABNORMAL
KETONES UR STRIP-MCNC: NEGATIVE MG/DL
LEUKOCYTE ESTERASE UR QL STRIP: ABNORMAL
NITRITE UR QL STRIP: NEGATIVE
NON-SQ EPI CELLS URNS QL MICRO: ABNORMAL /HPF
PH UR STRIP.AUTO: 6.5 [PH]
PROT UR STRIP-MCNC: NEGATIVE MG/DL
RBC #/AREA URNS AUTO: ABNORMAL /HPF
SP GR UR STRIP.AUTO: 1.01 (ref 1–1.03)
UROBILINOGEN UR STRIP-ACNC: <2 MG/DL
WBC #/AREA URNS AUTO: ABNORMAL /HPF

## 2025-02-25 PROCEDURE — 99213 OFFICE O/P EST LOW 20 MIN: CPT | Performed by: OBSTETRICS & GYNECOLOGY

## 2025-02-25 PROCEDURE — 81001 URINALYSIS AUTO W/SCOPE: CPT

## 2025-02-25 NOTE — PROGRESS NOTES
This is a 44-year-old female well-known to me, she is a  2 para 2 with 2 prior vaginal deliveries.  She is now complaining that her period came twice in the month of February with  and repeated again on .  She is not bleeding now.  She is concerned also that her cycles are changing sometimes appears, twice a month.  We talked about signs of ovulation we talked about early menopause.  She will be 45 in 6 weeks.    Examination of the external genitalia still show any kind of obvious discharge.  Molecular panel culture was done.  The bladder was somewhat tender but not impressive will check a urinalysis.  The adnexa is clear.    Her major concern is that her periods are coming more frequently we are now going do a menstrual calendar with she will keep track of her menstrual flow she will keep track of it but whether it is heavy light or spotting periods return my office in 6 weeks to go over the menstrual calendar.

## 2025-02-25 NOTE — PATIENT INSTRUCTIONS
Patient is complaining that her menstrual cycles, twice a month we will do a menstrual calendar return to my office in 6 weeks to reevaluate her calendar.  She is also complaining about sometimes pelvic pressure and burning.  Will check a urinalysis.  Her exam was negative.  If the.  Gets on a hand her bleeding is a stop she will let me know.  She should return to my office in 6 weeks to review menstrual calendar.  Will try to evaluate whether her cycles are really changed or is just an infrequent thing.

## 2025-02-26 ENCOUNTER — RESULTS FOLLOW-UP (OUTPATIENT)
Dept: OBGYN CLINIC | Facility: CLINIC | Age: 45
End: 2025-02-26

## 2025-02-26 ENCOUNTER — TELEPHONE (OUTPATIENT)
Age: 45
End: 2025-02-26

## 2025-02-26 ENCOUNTER — PATIENT MESSAGE (OUTPATIENT)
Dept: OBGYN CLINIC | Facility: CLINIC | Age: 45
End: 2025-02-26

## 2025-02-26 LAB
BV BACTERIA RRNA VAG QL NAA+PROBE: NEGATIVE
C GLABRATA RNA VAG QL NAA+PROBE: NOT DETECTED
CANDIDA RRNA VAG QL PROBE: NOT DETECTED
T VAGINALIS RRNA SPEC QL NAA+PROBE: NOT DETECTED

## 2025-02-26 NOTE — TELEPHONE ENCOUNTER
Pt called in to update that she will be going to her PCP today for follow up on her urine testing.

## 2025-02-26 NOTE — TELEPHONE ENCOUNTER
Pt calling saying she is returning a missed call.  No note advising call was made to her.  Warm call to office, advised provider had reached out to pt.  Warm transfer over.

## 2025-03-07 NOTE — PATIENT COMMUNICATION
Pt called in to follow up on the [employer] message sent to her. She is wondering why a radiologist is trying to contact her? Pt also states she is not taking the antibiotics for a UTI. Advised her urine culture is negative so antibiotics would not be needed anyway. Pt verbalized understanding and is thankful.

## 2025-03-09 DIAGNOSIS — L71.9 ROSACEA: ICD-10-CM

## 2025-03-10 NOTE — TELEPHONE ENCOUNTER
Patient seen in office 12/18/2024. Requesting refill. Patient does not have any follow ups scheduled. Patient prescribed 45g with 3 refills.

## 2025-03-13 ENCOUNTER — OFFICE VISIT (OUTPATIENT)
Age: 45
End: 2025-03-13
Payer: MEDICARE

## 2025-03-13 VITALS
HEART RATE: 103 BPM | SYSTOLIC BLOOD PRESSURE: 130 MMHG | DIASTOLIC BLOOD PRESSURE: 90 MMHG | OXYGEN SATURATION: 98 % | WEIGHT: 177.2 LBS | BODY MASS INDEX: 27.81 KG/M2 | HEIGHT: 67 IN

## 2025-03-13 DIAGNOSIS — M25.50 POLYARTHRALGIA: Primary | ICD-10-CM

## 2025-03-13 DIAGNOSIS — L71.9 ROSACEA: ICD-10-CM

## 2025-03-13 DIAGNOSIS — E55.9 VITAMIN D DEFICIENCY: ICD-10-CM

## 2025-03-13 PROBLEM — K59.00 CONSTIPATION: Status: ACTIVE | Noted: 2024-05-30

## 2025-03-13 PROBLEM — G44.219 EPISODIC TENSION-TYPE HEADACHE, NOT INTRACTABLE: Status: ACTIVE | Noted: 2017-03-21

## 2025-03-13 PROCEDURE — 99244 OFF/OP CNSLTJ NEW/EST MOD 40: CPT | Performed by: PHYSICIAN ASSISTANT

## 2025-03-13 NOTE — PROGRESS NOTES
Name: Yamila Decker      : 1980      MRN: [id number]  Encounter Provider: Yue Woods PA-C  Encounter Date: 3/13/2025   [facility]  :  Assessment & Plan  Polyarthralgia  History of episodic joint pain which has been worse anytime she takes a course of antibiotics.  She has also noticed episodes of joint pain as well.  She does have soft tissue prominence in her ankles however she has a history of bilateral lipomas.  I have recommended some additional lab work including repeat serologies and inflammatory markers.  I would also recommend musculoskeletal ultrasounds of her bilateral hands, wrists, feet, and ankles given the swelling she experiences and pronounced joint pain.  Will plan to follow-up in 6 to 8 weeks or sooner if needed.    Orders:    CBC and differential; Future    C-reactive protein; Future    Comprehensive metabolic panel; Future    RHEUMATOID FACTOR; Future    Cyclic citrul peptide antibody, IgG; Future    Lyme Ab/Western Blot Reflex; Future    TSH w/Reflex; Future    CK; Future    Sedimentation rate, automated; Future    MISCELLANEOUS LAB TEST; Future    Ambulatory Referral to Rheumatology    US RA FEET/ANKLES; Future    US RA Hands/Wrists; Future    Rosacea    Orders:    Ambulatory Referral to Rheumatology    Vitamin D deficiency    Orders:    Vitamin D 25 hydroxy; Future        History of Present Illness   Yamila Decker is a 44 y.o. female.  She is here for new patient evaluation for history of joint pain.  Her symptoms first started in .  At that time she was started on doxycycline for rosacea.  She notes that after starting the doxycycline she developed swelling in her ankles, worse in her right ankle.  These symptoms persisted for several months.  She was ultimately seen by orthopedics and also had MRIs of her right and left ankles.  The MRI of her right ankle revealed mild plantar fasciitis.  MRI of her left ankle  revealed a lipoma in her left ankle.  She was also noted to have mild to moderate peroneal brevis and longus tendinosis and tenosynovitis as well as mild posterior tibialis navicular insertional tendinosis and tenosynovitis, mild Achilles tendinosis, and plantar fasciitis.  Per orthopedic notes, she was also noted to have a lipoma in her right ankle as noted on exam.  No specific treatment was recommended at the time.  In the interim she has done physical therapy however this has not provided any significant relief for her.  She was seen by podiatry in 2020 for who discussed surgical removal of the lipomas.  At the time she was also experiencing knee pain which was felt to be secondary to osteoarthritis.    She notes that over the last year or so she has been on antibiotics for different types of infections including UTIs as well as a yeast infection.  She notes that anytime she takes antibiotics she has significant worsening of her joint pain and notes episodes of joint swelling.  She does note that the symptoms are still present even when she is not taking antibiotics however they are not as pronounced.    She has been following with GI for history of constipation.  She has had a colonoscopy in the past which showed hemorrhoids however otherwise was normal.  She did have an EGD done as well which showed an erosion and biopsies revealed gastritis and mild active duodenitis.  She has taken omeprazole in the past for this.    She notes significant fatigue.  She does get occasional headaches.  She has a history of high eyes and dry mouth.  She has no chest pain or shortness of breath.  She does have a history of constipation and GERD as noted above.  She notes significant dry skin.  She has no history of psoriasis or eczema.  She has no history of seizures or blood clots.    Reviewed labs done on 9/11/2024.  WENDY negative.  Sjogren's antibodies negative.  TSH within normal limits.  Vitamin D 26.  Vitamin B12  170.            Review of Systems   Constitutional:  Positive for fatigue. Negative for chills and fever.   HENT:  Negative for hearing loss, sore throat and tinnitus.         Dry mouth   Eyes:  Negative for pain and visual disturbance.        Dry eyes   Respiratory:  Negative for cough and shortness of breath.    Cardiovascular:  Negative for chest pain and palpitations.   Gastrointestinal:  Positive for constipation. Negative for abdominal pain, nausea and vomiting.        GERD   Genitourinary:  Negative for difficulty urinating.   Musculoskeletal:  Positive for arthralgias, back pain, joint swelling and myalgias. Negative for gait problem, neck pain and neck stiffness.   Skin:  Positive for rash (Rosacea).        Dry skin   Neurological:  Positive for headaches. Negative for dizziness, seizures, weakness and numbness.   Psychiatric/Behavioral:  Positive for sleep disturbance. Negative for confusion and decreased concentration.      Current Outpatient Medications on File Prior to Visit   Medication Sig Dispense Refill    Cholecalciferol (D3 2000) 50 MCG (2000 UT) CAPS Take 1 capsule by mouth daily      Cyanocobalamin (B-12) 500 MCG SUBL Take 1 tablet by mouth daily      metroNIDAZOLE (METROCREAM) 0.75 % cream APPLY TOPICALLY TO AFFECTED AREA(S) TWO TIMES DAILY TO THE FACE 45 g 1    NITROGLYCERIN 0.4% TOPICAL RECTAL OINTMENT Apply topically 2 (two) times a day      Ascorbic Acid (vitamin C) 100 MG tablet Take 100 mg by mouth daily      erythromycin (ILOTYCIN) ophthalmic ointment APPLY 0.25 INCH IN BOTH EYES EVERY EVENING      hyoscyamine (LEVSIN/SL) 0.125 mg SL tablet Take 1 tablet (0.125 mg total) by mouth every 4 (four) hours as needed for cramping 120 tablet 1    magnesium (MAGTAB) 84 MG (7MEQ) TBCR Take 84 mg by mouth daily 250mg      omeprazole (PriLOSEC) 40 MG capsule TAKE 1 CAPSULE BY MOUTH EVERY DAY 30 capsule 5    Propylene Glycol (SYSTANE COMPLETE PF OP) Apply to eye       No current facility-administered  "medications on file prior to visit.         Objective   /90   Pulse 103   Ht 5' 7\" (1.702 m)   Wt 80.4 kg (177 lb 3.2 oz)   LMP 02/18/2025 (Exact Date)   SpO2 98%   BMI 27.75 kg/m²      Physical Exam        Dragon Dictation software was used to dictate this note. It may contain errors with dictating incorrect words/spelling. Please contact provider directly for any questions.    "

## 2025-03-13 NOTE — ASSESSMENT & PLAN NOTE
History of episodic joint pain which has been worse anytime she takes a course of antibiotics.  She has also noticed episodes of joint pain as well.  She does have soft tissue prominence in her ankles however she has a history of bilateral lipomas.  I have recommended some additional lab work including repeat serologies and inflammatory markers.  I would also recommend musculoskeletal ultrasounds of her bilateral hands, wrists, feet, and ankles given the swelling she experiences and pronounced joint pain.  Will plan to follow-up in 6 to 8 weeks or sooner if needed.    Orders:    CBC and differential; Future    C-reactive protein; Future    Comprehensive metabolic panel; Future    RHEUMATOID FACTOR; Future    Cyclic citrul peptide antibody, IgG; Future    Lyme Ab/Western Blot Reflex; Future    TSH w/Reflex; Future    CK; Future    Sedimentation rate, automated; Future    MISCELLANEOUS LAB TEST; Future    Ambulatory Referral to Rheumatology    US RA FEET/ANKLES; Future    US RA Hands/Wrists; Future

## 2025-03-13 NOTE — PATIENT INSTRUCTIONS
Magnesium malate (300 mg daily)    For [facility] ultrasound:  - Central Scheduling [phone]   - Option #2, then option #1

## 2025-03-17 ENCOUNTER — APPOINTMENT (OUTPATIENT)
Dept: LAB | Facility: CLINIC | Age: 45
End: 2025-03-17
Payer: MEDICARE

## 2025-03-17 DIAGNOSIS — M25.50 POLYARTHRALGIA: ICD-10-CM

## 2025-03-17 DIAGNOSIS — E55.9 VITAMIN D DEFICIENCY: ICD-10-CM

## 2025-03-17 LAB
25(OH)D3 SERPL-MCNC: 38.8 NG/ML (ref 30–100)
ALBUMIN SERPL BCG-MCNC: 4.5 G/DL (ref 3.5–5)
ALP SERPL-CCNC: 98 U/L (ref 34–104)
ALT SERPL W P-5'-P-CCNC: 15 U/L (ref 7–52)
ANION GAP SERPL CALCULATED.3IONS-SCNC: 8 MMOL/L (ref 4–13)
AST SERPL W P-5'-P-CCNC: 16 U/L (ref 13–39)
BASOPHILS # BLD AUTO: 0.02 THOUSANDS/ÂΜL (ref 0–0.1)
BASOPHILS NFR BLD AUTO: 0 % (ref 0–1)
BILIRUB SERPL-MCNC: 0.41 MG/DL (ref 0.2–1)
BUN SERPL-MCNC: 13 MG/DL (ref 5–25)
CALCIUM SERPL-MCNC: 9 MG/DL (ref 8.4–10.2)
CHLORIDE SERPL-SCNC: 102 MMOL/L (ref 96–108)
CK SERPL-CCNC: 47 U/L (ref 26–192)
CO2 SERPL-SCNC: 27 MMOL/L (ref 21–32)
CREAT SERPL-MCNC: 0.63 MG/DL (ref 0.6–1.3)
CRP SERPL QL: 1 MG/L
EOSINOPHIL # BLD AUTO: 0.03 THOUSAND/ÂΜL (ref 0–0.61)
EOSINOPHIL NFR BLD AUTO: 1 % (ref 0–6)
ERYTHROCYTE [DISTWIDTH] IN BLOOD BY AUTOMATED COUNT: 13.6 % (ref 11.6–15.1)
ERYTHROCYTE [SEDIMENTATION RATE] IN BLOOD: 18 MM/HOUR (ref 0–19)
GFR SERPL CREATININE-BSD FRML MDRD: 110 ML/MIN/1.73SQ M
GLUCOSE P FAST SERPL-MCNC: 95 MG/DL (ref 65–99)
HCT VFR BLD AUTO: 37.5 % (ref 34.8–46.1)
HGB BLD-MCNC: 12.1 G/DL (ref 11.5–15.4)
IMM GRANULOCYTES # BLD AUTO: 0.02 THOUSAND/UL (ref 0–0.2)
IMM GRANULOCYTES NFR BLD AUTO: 0 % (ref 0–2)
LYMPHOCYTES # BLD AUTO: 2.08 THOUSANDS/ÂΜL (ref 0.6–4.47)
LYMPHOCYTES NFR BLD AUTO: 35 % (ref 14–44)
MCH RBC QN AUTO: 28.7 PG (ref 26.8–34.3)
MCHC RBC AUTO-ENTMCNC: 32.3 G/DL (ref 31.4–37.4)
MCV RBC AUTO: 89 FL (ref 82–98)
MONOCYTES # BLD AUTO: 0.43 THOUSAND/ÂΜL (ref 0.17–1.22)
MONOCYTES NFR BLD AUTO: 7 % (ref 4–12)
NEUTROPHILS # BLD AUTO: 3.32 THOUSANDS/ÂΜL (ref 1.85–7.62)
NEUTS SEG NFR BLD AUTO: 57 % (ref 43–75)
NRBC BLD AUTO-RTO: 0 /100 WBCS
PLATELET # BLD AUTO: 246 THOUSANDS/UL (ref 149–390)
PMV BLD AUTO: 11.4 FL (ref 8.9–12.7)
POTASSIUM SERPL-SCNC: 4.1 MMOL/L (ref 3.5–5.3)
PROT SERPL-MCNC: 7.1 G/DL (ref 6.4–8.4)
RBC # BLD AUTO: 4.22 MILLION/UL (ref 3.81–5.12)
RHEUMATOID FACT SERPL-ACNC: <10 IU/ML
SODIUM SERPL-SCNC: 137 MMOL/L (ref 135–147)
TSH SERPL DL<=0.05 MIU/L-ACNC: 3.72 UIU/ML (ref 0.45–4.5)
WBC # BLD AUTO: 5.9 THOUSAND/UL (ref 4.31–10.16)

## 2025-03-17 PROCEDURE — 86200 CCP ANTIBODY: CPT

## 2025-03-17 PROCEDURE — 85025 COMPLETE CBC W/AUTO DIFF WBC: CPT

## 2025-03-17 PROCEDURE — 86618 LYME DISEASE ANTIBODY: CPT

## 2025-03-17 PROCEDURE — 82550 ASSAY OF CK (CPK): CPT

## 2025-03-17 PROCEDURE — 85652 RBC SED RATE AUTOMATED: CPT

## 2025-03-17 PROCEDURE — 36415 COLL VENOUS BLD VENIPUNCTURE: CPT

## 2025-03-17 PROCEDURE — 86431 RHEUMATOID FACTOR QUANT: CPT

## 2025-03-17 PROCEDURE — 80053 COMPREHEN METABOLIC PANEL: CPT

## 2025-03-17 PROCEDURE — 86140 C-REACTIVE PROTEIN: CPT

## 2025-03-17 PROCEDURE — 82306 VITAMIN D 25 HYDROXY: CPT

## 2025-03-17 PROCEDURE — 84443 ASSAY THYROID STIM HORMONE: CPT

## 2025-03-18 LAB — B BURGDOR IGG+IGM SER QL IA: NEGATIVE

## 2025-03-21 LAB — CCP AB SER IA-ACNC: 1.3 (ref ?–10)

## 2025-03-24 ENCOUNTER — TELEPHONE (OUTPATIENT)
Age: 45
End: 2025-03-24

## 2025-03-24 NOTE — TELEPHONE ENCOUNTER
Melinda/ [employer]    Stating she needs a signed copy of the [facility] blood work script .    [phone]    PHONE [phone]

## 2025-03-25 NOTE — TELEPHONE ENCOUNTER
Checked the website and there was no additional info that was needed to be added and we already have the completed results.

## 2025-03-26 ENCOUNTER — OFFICE VISIT (OUTPATIENT)
Dept: OBGYN CLINIC | Facility: CLINIC | Age: 45
End: 2025-03-26
Payer: MEDICARE

## 2025-03-26 VITALS — DIASTOLIC BLOOD PRESSURE: 80 MMHG | SYSTOLIC BLOOD PRESSURE: 118 MMHG

## 2025-03-26 DIAGNOSIS — Z00.129 WELL ADOLESCENT VISIT: Primary | ICD-10-CM

## 2025-03-26 PROCEDURE — 99212 OFFICE O/P EST SF 10 MIN: CPT | Performed by: OBSTETRICS & GYNECOLOGY

## 2025-03-26 NOTE — PATIENT INSTRUCTIONS
The patient was reassured that there were no serious anomalies of her external genitalia.  The vagina itself is clean.  She was reassured.  She has evidence of skin pigmentation.  No evidence of a melanoma.

## 2025-03-26 NOTE — PROGRESS NOTES
This is a 44-year-old female well-known to me as a  2 para 2.  She is now complaining of increasing dark spots on external genitalia.  It is bilateral.  She is concerned this might be something serious.    Inspection of the external genitalia was within normal limits.  There was normal pigmentation with a couple scattered spots.  She was reassured.  This is a normal aging process.  It is not associated with any malady.  The rest of the vagina is clean.  There were no lesions inside the vagina.

## 2025-04-10 ENCOUNTER — HOSPITAL ENCOUNTER (OUTPATIENT)
Dept: RADIOLOGY | Facility: HOSPITAL | Age: 45
Discharge: HOME | End: 2025-04-10
Payer: MEDICARE

## 2025-04-10 DIAGNOSIS — M25.50 POLYARTHRALGIA: ICD-10-CM

## 2025-04-10 PROCEDURE — 76882 US LMTD JT/FCL EVL NVASC XTR: CPT

## 2025-04-17 ENCOUNTER — OFFICE VISIT (OUTPATIENT)
Dept: OBGYN CLINIC | Facility: CLINIC | Age: 45
End: 2025-04-17
Payer: MEDICARE

## 2025-04-17 VITALS
DIASTOLIC BLOOD PRESSURE: 72 MMHG | WEIGHT: 180 LBS | BODY MASS INDEX: 28.25 KG/M2 | SYSTOLIC BLOOD PRESSURE: 116 MMHG | HEIGHT: 67 IN

## 2025-04-17 DIAGNOSIS — N93.9 ABNORMAL UTERINE BLEEDING (AUB): Primary | ICD-10-CM

## 2025-04-17 PROCEDURE — 99213 OFFICE O/P EST LOW 20 MIN: CPT | Performed by: OBSTETRICS & GYNECOLOGY

## 2025-04-17 RX ORDER — CYCLOBENZAPRINE HCL 5 MG
TABLET ORAL
COMMUNITY
Start: 2025-03-26

## 2025-04-17 NOTE — PROGRESS NOTES
This is a 44-year-old female, she is a  2 para 2 with 2 prior vaginal deliveries.  She is seen today for abnormal cycles.  She report a menstrual calendar and.  She still getting a period mostly on a regular basis.  But she is good for 3 days a period and then 2 days off and then followed by 2 more days of bleeding.  She does not want any kind of hormonal manipulation.    Pelvic examination was performed.  The pelvic exam was mostly benign.  The uterus is slightly enlarged.  Maybe a small fibroid on the patient's right side.    Impression history of abnormal cycles.  Negative exam exam we will order a pelvic and transvaginal ultrasound.

## 2025-04-17 NOTE — PATIENT INSTRUCTIONS
Talked about her pelvic examination shows slightly enlarged uterus.  Will make arranges for an ultrasound.  May be a fibroid on the patient's right side.  She is not interested in doing any kind of medical manipulation at the present time.  She is going continue to track her menstrual cycles and any major changes she will let me know.  She will be informed the results of the ultrasound when it returns.

## 2025-04-18 ENCOUNTER — HOSPITAL ENCOUNTER (OUTPATIENT)
Dept: RADIOLOGY | Facility: HOSPITAL | Age: 45
Discharge: HOME | End: 2025-04-18
Attending: OBSTETRICS & GYNECOLOGY
Payer: MEDICARE

## 2025-04-18 DIAGNOSIS — N93.9 ABNORMAL UTERINE BLEEDING (AUB): ICD-10-CM

## 2025-04-18 PROCEDURE — 76830 TRANSVAGINAL US NON-OB: CPT

## 2025-04-18 PROCEDURE — 76856 US EXAM PELVIC COMPLETE: CPT

## 2025-04-28 ENCOUNTER — OFFICE VISIT (OUTPATIENT)
Dept: GASTROENTEROLOGY | Facility: MEDICAL CENTER | Age: 45
End: 2025-04-28
Payer: MEDICARE

## 2025-04-28 VITALS
HEART RATE: 93 BPM | HEIGHT: 67 IN | DIASTOLIC BLOOD PRESSURE: 79 MMHG | SYSTOLIC BLOOD PRESSURE: 120 MMHG | BODY MASS INDEX: 27.94 KG/M2 | TEMPERATURE: 98.7 F | OXYGEN SATURATION: 98 % | WEIGHT: 178 LBS

## 2025-04-28 DIAGNOSIS — K64.8 OTHER HEMORRHOIDS: ICD-10-CM

## 2025-04-28 DIAGNOSIS — K59.00 CONSTIPATION, UNSPECIFIED CONSTIPATION TYPE: Primary | ICD-10-CM

## 2025-04-28 PROCEDURE — 99214 OFFICE O/P EST MOD 30 MIN: CPT | Performed by: INTERNAL MEDICINE

## 2025-04-28 NOTE — ASSESSMENT & PLAN NOTE
She has a history of chronic constipation and had a colonoscopy last year which was overall unremarkable other than small internal hemorrhoids.  She started taking a supplement called Collinsonia root that she found online.  From the ingredients they contain gelatin, water and calcium.  She is happy with her resolution of symptoms with the use of this supplement.  We discussed that the supplements are not FDA approved and may contain additional unknown ingredients/additives.  I discussed the importance of high-fiber diet, fiber supplementation and adequate hydration for good bowel regimen.

## 2025-04-28 NOTE — PROGRESS NOTES
Name: Yamila Decekr      : 1980      MRN: [id number]  Encounter Provider: Diana M Jaiyeola, MD  Encounter Date: 2025   Encounter department: [facility]  :  Assessment & Plan  Constipation, unspecified constipation type  She has a history of chronic constipation and had a colonoscopy last year which was overall unremarkable other than small internal hemorrhoids.  She started taking a supplement called Collinsonia root that she found online.  From the ingredients they contain gelatin, water and calcium.  She is happy with her resolution of symptoms with the use of this supplement.  We discussed that the supplements are not FDA approved and may contain additional unknown ingredients/additives.  I discussed the importance of high-fiber diet, fiber supplementation and adequate hydration for good bowel regimen.     Other hemorrhoids           Follow-up in 1 year  History of Present Illness   HPI  Yamila Decker is a 45 y.o. female who presents for evaluation.  She has no significant past medical history    She was last seen in the GI office in 2024 for hemorrhoidal irritation.  She had recently undergone colonoscopy in 2024 which was normal other than internal hemorrhoids.  At that time she was recommended conservative treatment for her hemorrhoids.  She also reported lower abdominal pain and prior lightheadedness with Bentyl and was prescribed Levsin.    Interval history: She has no longer taking Levsin.  She started to have herbal supplement called Collinsonia route that she found online.  The medication has been allowing for regular formed bowel movements and less irritation from her hemorrhoids.  She denies abdominal pain.         EGD 2024 showing erosion in the stomach.  Colonoscopy showed hemorrhoids otherwise normal exam and she was recommended repeat in 10 years.  Gastric biopsy showed gastritis and duodenal biopsy showed mild active duodenitis.        History obtained from: patient    Review of Systems   Constitutional:  Negative for chills and fever.   HENT:  Negative for ear pain and sore throat.    Eyes:  Negative for pain and visual disturbance.   Respiratory:  Negative for cough and shortness of breath.    Cardiovascular:  Negative for chest pain and palpitations.   Gastrointestinal:  Negative for abdominal pain and vomiting.   Genitourinary:  Negative for dysuria and hematuria.   Musculoskeletal:  Negative for arthralgias and back pain.   Skin:  Negative for color change and rash.   Neurological:  Negative for seizures and syncope.   All other systems reviewed and are negative.    Past Medical History   Past Medical History:   Diagnosis Date    Eczema 2009    Rosacea     Past Surgical History:   Procedure Laterality Date    COLONOSCOPY  07/03/24    TOOTH EXTRACTION N/A 10/2024     Family History   Problem Relation Age of Onset    Diabetes Mother         Diabetes Mellitus    Hypertension Mother     Osteoarthritis Mother     Liver cancer Father     Pancreatic cancer Father     Cancer Father     No Known Problems Sister     No Known Problems Sister     No Known Problems Maternal Grandmother     No Known Problems Maternal Grandfather     No Known Problems Paternal Grandmother     No Known Problems Paternal Grandfather     No Known Problems Brother     No Known Problems Son     No Known Problems Son     No Known Problems Maternal Aunt     No Known Problems Maternal Aunt     No Known Problems Maternal Aunt     No Known Problems Maternal Aunt     No Known Problems Maternal Aunt     No Known Problems Maternal Uncle     No Known Problems Maternal Uncle     No Known Problems Paternal Aunt     No Known Problems Paternal Uncle     No Known Problems Paternal Uncle     Breast cancer Neg Hx       reports that she has never smoked. She has never used smokeless tobacco. She reports that she does not drink alcohol and does not use drugs.  Current Outpatient Medications  "  Medication Instructions    Cholecalciferol (D3 2000) 50 MCG (2000 UT) CAPS 1 capsule, Daily    Cyanocobalamin (B-12) 500 MCG SUBL 1 tablet, Daily    cyclobenzaprine (FLEXERIL) 5 mg tablet take 1 tablet by mouth nightly as needed for muscle spasms    erythromycin (ILOTYCIN) ophthalmic ointment APPLY 0.25 INCH IN BOTH EYES EVERY EVENING    hyoscyamine (LEVSIN/SL) 0.125 mg, Oral, Every 4 hours PRN    magnesium (MAGTAB) 84 mg, Daily    MAGNESIUM MALATE PO     metroNIDAZOLE (METROCREAM) 0.75 % cream APPLY TOPICALLY TO AFFECTED AREA(S) TWO TIMES DAILY TO THE FACE    NITROGLYCERIN 0.4% TOPICAL RECTAL OINTMENT 2 times daily    omeprazole (PRILOSEC) 40 mg, Oral, Daily    Propylene Glycol (SYSTANE COMPLETE PF OP) Apply to eye    vitamin C 100 mg, Daily     Allergies   Allergen Reactions    Doxycycline Swelling     ankles    Penicillins Rash         Objective   /79 (BP Location: Left arm, Patient Position: Sitting, Cuff Size: Standard)   Pulse 93   Temp 98.7 °F (37.1 °C) (Tympanic)   Ht 5' 7\" (1.702 m)   Wt 80.7 kg (178 lb)   LMP 04/03/2025 (Exact Date)   SpO2 98%   BMI 27.88 kg/m²      Physical Exam  Vitals and nursing note reviewed.   Constitutional:       General: She is not in acute distress.     Appearance: She is well-developed.   HENT:      Head: Normocephalic and atraumatic.   Eyes:      Conjunctiva/sclera: Conjunctivae normal.   Cardiovascular:      Rate and Rhythm: Normal rate and regular rhythm.      Heart sounds: No murmur heard.  Pulmonary:      Effort: Pulmonary effort is normal. No respiratory distress.      Breath sounds: Normal breath sounds.   Abdominal:      Palpations: Abdomen is soft.      Tenderness: There is no abdominal tenderness.   Musculoskeletal:         General: No swelling.      Cervical back: Neck supple.   Skin:     General: Skin is warm and dry.      Capillary Refill: Capillary refill takes less than 2 seconds.   Neurological:      Mental Status: She is alert.   Psychiatric:    "      Mood and Affect: Mood normal.

## 2025-05-11 DIAGNOSIS — L71.9 ROSACEA: ICD-10-CM

## 2025-07-08 DIAGNOSIS — L71.9 ROSACEA: ICD-10-CM

## 2025-07-31 ENCOUNTER — OFFICE VISIT (OUTPATIENT)
Age: 45
End: 2025-07-31
Payer: MEDICARE

## 2025-07-31 VITALS
HEART RATE: 87 BPM | TEMPERATURE: 98.9 F | BODY MASS INDEX: 27.78 KG/M2 | DIASTOLIC BLOOD PRESSURE: 68 MMHG | OXYGEN SATURATION: 99 % | HEIGHT: 67 IN | SYSTOLIC BLOOD PRESSURE: 116 MMHG | WEIGHT: 177 LBS

## 2025-07-31 DIAGNOSIS — M19.90 INFLAMMATORY ARTHRITIS: Primary | ICD-10-CM

## 2025-07-31 DIAGNOSIS — Z79.899 ENCOUNTER FOR LONG-TERM (CURRENT) USE OF MEDICATIONS: ICD-10-CM

## 2025-07-31 PROCEDURE — 99214 OFFICE O/P EST MOD 30 MIN: CPT | Performed by: PHYSICIAN ASSISTANT

## 2025-07-31 RX ORDER — HYDROXYCHLOROQUINE SULFATE 200 MG/1
200 TABLET, FILM COATED ORAL 2 TIMES DAILY WITH MEALS
Qty: 180 TABLET | Refills: 1 | Status: SHIPPED | OUTPATIENT
Start: 2025-07-31 | End: 2026-01-27